# Patient Record
Sex: MALE | Race: WHITE | NOT HISPANIC OR LATINO
[De-identification: names, ages, dates, MRNs, and addresses within clinical notes are randomized per-mention and may not be internally consistent; named-entity substitution may affect disease eponyms.]

---

## 2023-03-23 ENCOUNTER — FORM ENCOUNTER (OUTPATIENT)
Age: 58
End: 2023-03-23

## 2023-03-27 PROBLEM — Z00.00 ENCOUNTER FOR PREVENTIVE HEALTH EXAMINATION: Status: ACTIVE | Noted: 2023-03-27

## 2023-03-29 ENCOUNTER — APPOINTMENT (OUTPATIENT)
Dept: INFECTIOUS DISEASE | Facility: CLINIC | Age: 58
End: 2023-03-29
Payer: COMMERCIAL

## 2023-03-29 ENCOUNTER — NON-APPOINTMENT (OUTPATIENT)
Age: 58
End: 2023-03-29

## 2023-03-29 VITALS
DIASTOLIC BLOOD PRESSURE: 72 MMHG | OXYGEN SATURATION: 96 % | HEART RATE: 62 BPM | TEMPERATURE: 98.4 F | SYSTOLIC BLOOD PRESSURE: 118 MMHG

## 2023-03-29 DIAGNOSIS — E78.5 HYPERLIPIDEMIA, UNSPECIFIED: ICD-10-CM

## 2023-03-29 DIAGNOSIS — J45.909 UNSPECIFIED ASTHMA, UNCOMPLICATED: ICD-10-CM

## 2023-03-29 DIAGNOSIS — C67.9 MALIGNANT NEOPLASM OF BLADDER, UNSPECIFIED: ICD-10-CM

## 2023-03-29 DIAGNOSIS — R78.81 BACTEREMIA: ICD-10-CM

## 2023-03-29 DIAGNOSIS — B95.5 BACTEREMIA: ICD-10-CM

## 2023-03-29 DIAGNOSIS — N39.0 URINARY TRACT INFECTION, SITE NOT SPECIFIED: ICD-10-CM

## 2023-03-29 PROCEDURE — 99213 OFFICE O/P EST LOW 20 MIN: CPT

## 2023-03-29 NOTE — REVIEW OF SYSTEMS
[Fever] : no fever [Chills] : no chills [Body Aches] : no body aches [Joint Swelling] : no joint swelling [Joint Stiffness] : no joint stiffness [Anxiety] : no anxiety [Swollen Glands] : no swollen glands [Negative] : Genitourinary

## 2023-03-29 NOTE — REASON FOR VISIT
[Follow-Up: _____] : a [unfilled] follow-up visit [FreeTextEntry1] : Patient here today following up from his stay at [facility] where he was treated for a UTI/prostate abscess. Patient continues IV Ceftriaxone 2G QD via LUE midline and is tolerating it well. Patient states he is feeling well and is not currently experiencing UTI symptoms. He is here today to discuss ongoing care.

## 2023-03-29 NOTE — HISTORY OF PRESENT ILLNESS
[FreeTextEntry1] : 58 y/o man with bladder cancer, NETO, HLD, asthma, who had a recent urological procedure\par TURBT on 3/10 with a needle aspiration of prostate abscess on 3/10, discharged on po cipro for \par 5 days after, who presented to the ER 3/19 with generalized weakness, fatigue, and chills. He\par was diagnosed with a UTI and was discharged on po abx again which he took for 1.5 days the\par was called back to return to the ER for admission for a positive blood culture with GPC. In the \par he was afebrile and without complaints. He denies any more chills. He denies flank pain excep\par for occasional right flank pain which he had before. He denies urinary complaints such as dysuria\par or hematuria. Wound culture from procedure on 3/10 showed Bacteroides ovatus, Fusobacteria\par nucleatum and mixed anaerobic stuart. Blood cx 3/19 grew Streptococcus anginosus. 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 He previously had bladder cancer and received intravesicular BCG in 2021.  No antimycobacterial therapy was started.

## 2023-03-29 NOTE — PHYSICAL EXAM
[General Appearance - Alert] : alert [General Appearance - In No Acute Distress] : in no acute distress [General Appearance - Well Nourished] : well nourished [General Appearance - Well-Appearing] : healthy appearing [Sclera] : the sclera and conjunctiva were normal [Extraocular Movements] : extraocular movements were intact [Hearing Threshold Finger Rub Not Runnels] : hearing was normal [Examination Of The Oral Cavity] : the lips and gums were normal [Oropharynx] : the oropharynx was normal with no thrush [Neck Appearance] : the appearance of the neck was normal [Respiration, Rhythm And Depth] : normal respiratory rhythm and effort [Auscultation Breath Sounds / Voice Sounds] : lungs were clear to auscultation bilaterally [Heart Sounds] : normal S1 and S2 [Murmurs] : no murmurs [Edema] : there was no peripheral edema [Abdomen Tenderness] : non-tender [] : no hepato-splenomegaly [Supraclavicular Lymph Nodes Enlarged Bilaterally] : supraclavicular [Nail Clubbing] : no clubbing  or cyanosis of the fingernails [Motor Tone] : muscle strength and tone were normal

## 2023-03-29 NOTE — ASSESSMENT
[FreeTextEntry1] : 58 y/o man with bladder cancer, NETO, HLD, asthma, who had a recent urological procedure\par TURBT on 3/10 with a needle aspiration of prostate abscess on 3/10, discharged on po cipro for\par 5 days after, who presented to the ER 3/19 with generalized weakness, fatigue, and chills. He\par was diagnosed with a UTI and was discharged on po abx again which he took for 1.5 days then\par was called back to return to the ER for admission for a positive blood culture with GPC.\par Wound culture from procedure on 3/10 showed Bacteroides ovatus, Fusobacterium nucleatum\par and mixed anaerobic stuart\par Blood cx 3/19 grew Streptococcus anginosus-this could be from the prostate itself or from the aspiration of the abscess\par outpatient mycobacteria probe + Mbovis - he is s/p prior BCG ? if active versus residual DNA last\par tx was 2 years ago\par \par REC\par 1.  Complete 14 days of IV Rocephin and oral metronidazole\par 2.  Would hold any antimycobacterial therapy at this time as he has responded to treatment that does no activity against M.bovis\par 3.  Patient should be safe to receive further BCG as needed for bladder cancer\par \par \par \par Patient return here on a as needed basis

## 2023-06-15 ENCOUNTER — OFFICE (OUTPATIENT)
Dept: URBAN - METROPOLITAN AREA CLINIC 63 | Facility: CLINIC | Age: 58
Setting detail: OPHTHALMOLOGY
End: 2023-06-15
Payer: COMMERCIAL

## 2023-06-15 DIAGNOSIS — H01.001: ICD-10-CM

## 2023-06-15 DIAGNOSIS — H16.223: ICD-10-CM

## 2023-06-15 DIAGNOSIS — H25.13: ICD-10-CM

## 2023-06-15 DIAGNOSIS — H01.005: ICD-10-CM

## 2023-06-15 DIAGNOSIS — H40.013: ICD-10-CM

## 2023-06-15 DIAGNOSIS — H52.4: ICD-10-CM

## 2023-06-15 DIAGNOSIS — H01.004: ICD-10-CM

## 2023-06-15 DIAGNOSIS — H01.002: ICD-10-CM

## 2023-06-15 PROCEDURE — 92133 CPTRZD OPH DX IMG PST SGM ON: CPT | Performed by: STUDENT IN AN ORGANIZED HEALTH CARE EDUCATION/TRAINING PROGRAM

## 2023-06-15 PROCEDURE — 92004 COMPRE OPH EXAM NEW PT 1/>: CPT | Performed by: STUDENT IN AN ORGANIZED HEALTH CARE EDUCATION/TRAINING PROGRAM

## 2023-06-15 PROCEDURE — 92015 DETERMINE REFRACTIVE STATE: CPT | Performed by: STUDENT IN AN ORGANIZED HEALTH CARE EDUCATION/TRAINING PROGRAM

## 2023-06-15 ASSESSMENT — REFRACTION_MANIFEST
OD_AXIS: 180
OD_CYLINDER: -0.25
OD_VA1: 20/20
OS_ADD: +2.00
OS_VA2: 20/20(J1+)
OS_SPHERE: +0.50
OD_VA1: 20/20
OD_SPHERE: +0.75
OD_ADD: +2.00
OD_ADD: +2.50
OD_SPHERE: +0.50
OS_ADD: +2.00
OD_AXIS: 070
OD_VA2: 20/20(J1+)
OS_CYLINDER: SPHERE
OS_SPHERE: +0.50
OS_SPHERE: +0.50
OS_AXIS: 0
OS_ADD: +2.50
OS_CYLINDER: -0.25
OS_VA1: 20/20
OS_AXIS: 180
OD_ADD: +2.00
OD_SPHERE: +0.50
OS_VA1: 20/20
OD_CYLINDER: SPHERE

## 2023-06-15 ASSESSMENT — KERATOMETRY
OS_K1POWER_DIOPTERS: 42.25
OD_K1POWER_DIOPTERS: 41.75
OS_K2POWER_DIOPTERS: 42.25
OD_AXISANGLE_DEGREES: 044
OS_AXISANGLE_DEGREES: 090
OD_K2POWER_DIOPTERS: 42.00

## 2023-06-15 ASSESSMENT — REFRACTION_CURRENTRX
OS_AXIS: 180
OS_SPHERE: +2.50
OS_SPHERE: +0.50
OD_SPHERE: +0.50
OS_VPRISM_DIRECTION: PROGS
OS_OVR_VA: 20/
OD_OVR_VA: 20/
OD_OVR_VA: 20/
OD_SPHERE: +2.50
OD_AXIS: 180
OS_OVR_VA: 20/
OD_ADD: +2.00
OD_VPRISM_DIRECTION: PROGS
OD_CYLINDER: 0.00
OS_CYLINDER: 0.00
OS_ADD: +2.00

## 2023-06-15 ASSESSMENT — AXIALLENGTH_DERIVED
OS_AL: 23.8095
OD_AL: 23.9507
OS_AL: 23.9091

## 2023-06-15 ASSESSMENT — SPHEQUIV_DERIVED
OD_SPHEQUIV: 0.625
OS_SPHEQUIV: 0.625
OS_SPHEQUIV: 0.375

## 2023-06-15 ASSESSMENT — REFRACTION_AUTOREFRACTION
OS_CYLINDER: -0.25
OS_SPHERE: +0.75
OS_AXIS: 079
OD_SPHERE: +0.75

## 2023-06-15 ASSESSMENT — PACHYMETRY
OS_CT_CORRECTION: -2
OS_CT_UM: 572
OD_CT_CORRECTION: -2
OD_CT_UM: 577

## 2023-06-15 ASSESSMENT — VISUAL ACUITY
OD_BCVA: 20/20
OS_BCVA: 20/20

## 2023-06-15 ASSESSMENT — LID EXAM ASSESSMENTS
OD_BLEPHARITIS: RLL RUL 1+
OS_BLEPHARITIS: LLL LUL 1+

## 2023-06-15 ASSESSMENT — SUPERFICIAL PUNCTATE KERATITIS (SPK)
OS_SPK: 1+ 2+
OD_SPK: 1+ 2+

## 2023-06-15 ASSESSMENT — CONFRONTATIONAL VISUAL FIELD TEST (CVF)
OS_FINDINGS: FULL
OD_FINDINGS: FULL

## 2023-06-15 ASSESSMENT — TONOMETRY
OD_IOP_MMHG: 20
OD_IOP_MMHG: 18
OS_IOP_MMHG: 20

## 2023-12-19 ENCOUNTER — OFFICE (OUTPATIENT)
Dept: URBAN - METROPOLITAN AREA CLINIC 63 | Facility: CLINIC | Age: 58
Setting detail: OPHTHALMOLOGY
End: 2023-12-19
Payer: COMMERCIAL

## 2023-12-19 DIAGNOSIS — H01.001: ICD-10-CM

## 2023-12-19 DIAGNOSIS — H40.013: ICD-10-CM

## 2023-12-19 DIAGNOSIS — H16.223: ICD-10-CM

## 2023-12-19 DIAGNOSIS — H01.005: ICD-10-CM

## 2023-12-19 DIAGNOSIS — H01.002: ICD-10-CM

## 2023-12-19 DIAGNOSIS — H25.13: ICD-10-CM

## 2023-12-19 DIAGNOSIS — H01.004: ICD-10-CM

## 2023-12-19 PROCEDURE — 92083 EXTENDED VISUAL FIELD XM: CPT | Performed by: STUDENT IN AN ORGANIZED HEALTH CARE EDUCATION/TRAINING PROGRAM

## 2023-12-19 PROCEDURE — 92134 CPTRZ OPH DX IMG PST SGM RTA: CPT | Performed by: STUDENT IN AN ORGANIZED HEALTH CARE EDUCATION/TRAINING PROGRAM

## 2023-12-19 PROCEDURE — 92012 INTRM OPH EXAM EST PATIENT: CPT | Performed by: STUDENT IN AN ORGANIZED HEALTH CARE EDUCATION/TRAINING PROGRAM

## 2023-12-19 ASSESSMENT — LID EXAM ASSESSMENTS
OD_BLEPHARITIS: RLL RUL 1+
OS_BLEPHARITIS: LLL LUL 1+

## 2023-12-19 ASSESSMENT — SUPERFICIAL PUNCTATE KERATITIS (SPK)
OS_SPK: 1+ 2+
OD_SPK: 1+ 2+

## 2023-12-19 ASSESSMENT — CONFRONTATIONAL VISUAL FIELD TEST (CVF)
OS_FINDINGS: FULL
OD_FINDINGS: FULL

## 2023-12-20 ENCOUNTER — APPOINTMENT (OUTPATIENT)
Dept: COLORECTAL SURGERY | Facility: CLINIC | Age: 58
End: 2023-12-20
Payer: COMMERCIAL

## 2023-12-20 VITALS
TEMPERATURE: 97.7 F | DIASTOLIC BLOOD PRESSURE: 90 MMHG | BODY MASS INDEX: 32.93 KG/M2 | SYSTOLIC BLOOD PRESSURE: 130 MMHG | WEIGHT: 230 LBS | HEIGHT: 70 IN | HEART RATE: 58 BPM | RESPIRATION RATE: 12 BRPM

## 2023-12-20 DIAGNOSIS — Z87.09 PERSONAL HISTORY OF OTHER DISEASES OF THE RESPIRATORY SYSTEM: ICD-10-CM

## 2023-12-20 DIAGNOSIS — Z85.51 PERSONAL HISTORY OF MALIGNANT NEOPLASM OF BLADDER: ICD-10-CM

## 2023-12-20 DIAGNOSIS — Z87.442 PERSONAL HISTORY OF URINARY CALCULI: ICD-10-CM

## 2023-12-20 DIAGNOSIS — K57.32 DIVERTICULITIS OF LARGE INTESTINE W/OUT PERFORATION OR ABSCESS W/OUT BLEEDING: ICD-10-CM

## 2023-12-20 DIAGNOSIS — Z86.69 PERSONAL HISTORY OF OTHER DISEASES OF THE NERVOUS SYSTEM AND SENSE ORGANS: ICD-10-CM

## 2023-12-20 DIAGNOSIS — G47.33 OBSTRUCTIVE SLEEP APNEA (ADULT) (PEDIATRIC): ICD-10-CM

## 2023-12-20 DIAGNOSIS — A18.12 TUBERCULOSIS OF BLADDER: ICD-10-CM

## 2023-12-20 DIAGNOSIS — Z87.891 PERSONAL HISTORY OF NICOTINE DEPENDENCE: ICD-10-CM

## 2023-12-20 DIAGNOSIS — Z87.19 PERSONAL HISTORY OF OTHER DISEASES OF THE DIGESTIVE SYSTEM: ICD-10-CM

## 2023-12-20 DIAGNOSIS — Z82.5 FAMILY HISTORY OF ASTHMA AND OTHER CHRONIC LOWER RESPIRATORY DISEASES: ICD-10-CM

## 2023-12-20 DIAGNOSIS — Z86.010 PERSONAL HISTORY OF COLONIC POLYPS: ICD-10-CM

## 2023-12-20 DIAGNOSIS — N41.2 ABSCESS OF PROSTATE: ICD-10-CM

## 2023-12-20 DIAGNOSIS — Z78.9 OTHER SPECIFIED HEALTH STATUS: ICD-10-CM

## 2023-12-20 DIAGNOSIS — Z87.898 PERSONAL HISTORY OF OTHER SPECIFIED CONDITIONS: ICD-10-CM

## 2023-12-20 PROBLEM — Z79.891 LONG TERM USE OF OTHER MEDICATIONS: Status: ACTIVE | Noted: 2023-12-19

## 2023-12-20 PROCEDURE — 99204 OFFICE O/P NEW MOD 45 MIN: CPT

## 2023-12-20 RX ORDER — CETIRIZINE HYDROCHLORIDE 10 MG/1
10 TABLET, COATED ORAL
Refills: 0 | Status: ACTIVE | COMMUNITY

## 2023-12-20 RX ORDER — RIFAMPIN 300 MG/1
300 CAPSULE ORAL
Refills: 0 | Status: ACTIVE | COMMUNITY

## 2023-12-20 RX ORDER — ASPIRIN 81 MG
81 TABLET, DELAYED RELEASE (ENTERIC COATED) ORAL
Refills: 0 | Status: ACTIVE | COMMUNITY

## 2023-12-20 RX ORDER — SUVOREXANT 15 MG/1
15 TABLET, FILM COATED ORAL
Refills: 0 | Status: ACTIVE | COMMUNITY

## 2023-12-20 RX ORDER — MONTELUKAST SODIUM 10 MG/1
10 TABLET, FILM COATED ORAL
Refills: 0 | Status: ACTIVE | COMMUNITY

## 2023-12-20 RX ORDER — FENOFIBRATE 145 MG/1
145 TABLET, COATED ORAL
Refills: 0 | Status: ACTIVE | COMMUNITY

## 2023-12-20 RX ORDER — MOMETASONE FUROATE 50 UG/1
50 SPRAY NASAL
Refills: 0 | Status: ACTIVE | COMMUNITY

## 2023-12-20 RX ORDER — FLUTICASONE PROPION/SALMETEROL 100-50 MCG
100-50 BLISTER, WITH INHALATION DEVICE INHALATION
Refills: 0 | Status: ACTIVE | COMMUNITY

## 2023-12-20 RX ORDER — DICYCLOMINE HYDROCHLORIDE 20 MG/1
20 TABLET ORAL
Refills: 0 | Status: ACTIVE | COMMUNITY

## 2023-12-20 RX ORDER — GABAPENTIN 100 MG/1
CAPSULE ORAL
Refills: 0 | Status: ACTIVE | COMMUNITY

## 2023-12-20 RX ORDER — ISONIAZID 300 MG/1
300 TABLET ORAL
Refills: 0 | Status: ACTIVE | COMMUNITY

## 2023-12-20 RX ORDER — DICLOFENAC SODIUM 75 MG/1
75 TABLET, DELAYED RELEASE ORAL
Refills: 0 | Status: ACTIVE | COMMUNITY

## 2023-12-20 RX ORDER — LACTOBACILLUS ACIDOPHILUS/PECT 30 MG-20MG
TABLET ORAL
Refills: 0 | Status: ACTIVE | COMMUNITY

## 2023-12-20 RX ORDER — ETHAMBUTOL HYDROCHLORIDE 400 MG/1
400 TABLET ORAL
Refills: 0 | Status: ACTIVE | COMMUNITY

## 2023-12-20 ASSESSMENT — REFRACTION_CURRENTRX
OD_AXIS: 180
OS_CYLINDER: 0.00
OD_CYLINDER: 0.00
OD_SPHERE: +0.50
OD_ADD: +2.00
OS_SPHERE: +0.50
OD_SPHERE: +2.50
OD_OVR_VA: 20/
OS_OVR_VA: 20/
OS_ADD: +2.00
OD_VPRISM_DIRECTION: PROGS
OS_AXIS: 180
OS_VPRISM_DIRECTION: PROGS
OS_SPHERE: +2.50
OS_OVR_VA: 20/
OD_OVR_VA: 20/

## 2023-12-20 ASSESSMENT — REFRACTION_MANIFEST
OS_SPHERE: +0.50
OS_ADD: +2.00
OD_AXIS: 070
OS_SPHERE: +0.50
OS_AXIS: 0
OD_ADD: +2.00
OD_SPHERE: +0.75
OD_VA1: 20/20
OS_CYLINDER: SPHERE
OD_ADD: +2.50
OD_VA2: 20/20(J1+)
OS_VA2: 20/20(J1+)
OD_ADD: +2.00
OD_SPHERE: +0.50
OS_VA1: 20/20
OS_SPHERE: +0.50
OD_CYLINDER: -0.25
OD_VA1: 20/20
OS_CYLINDER: -0.25
OS_ADD: +2.50
OS_AXIS: 180
OD_CYLINDER: SPHERE
OD_SPHERE: +0.50
OS_ADD: +2.00
OD_AXIS: 180
OS_VA1: 20/20

## 2023-12-20 ASSESSMENT — REFRACTION_AUTOREFRACTION
OS_CYLINDER: -0.25
OD_SPHERE: +0.75
OS_AXIS: 086
OS_SPHERE: +0.75

## 2023-12-20 ASSESSMENT — SPHEQUIV_DERIVED
OD_SPHEQUIV: 0.625
OS_SPHEQUIV: 0.625
OS_SPHEQUIV: 0.375

## 2023-12-21 NOTE — HISTORY OF PRESENT ILLNESS
[FreeTextEntry1] : 57yo WM with initial bout of CT proven diverticulitis @15yrs ago. Over the years pt has been treated @7times with ABX.  2021 CT scan performed during treatment for diverticulitis. This revealed residual changes. Also revealed bladder abnormality (ultimately dxd with bladder CA. Underwent TURBT/BCG).  July 2023 CT revealed SC diverticulitis. Dec 5, 2023 CT revealed desc-sigmoid junction diverticulitis. Started oral ABX (last dose 12/19).  Presently without pain. Daily BM. Probiotic daily. Last colonoscopy 4yrs ago. Advised to schedule colorectal consultation.

## 2023-12-21 NOTE — ASSESSMENT
[FreeTextEntry1] : 58-year-old gentleman who presents today for consultation regarding management of recurrent diverticulitis.  Patient's had approximately 7 bouts of diverticulitis over the past 15 years.  His most recent bouts include one in July 2021 involving the proximal sigmoid colon.  In July 23 he again had sigmoid diverticulitis.  He went for a follow-up CAT scan in December 2023 and basically had minimal symptomatology but was found to have diverticulitis at the descending sigmoid junction.  Subsequent to this study his pain actually increased and he describes this as one of, if not the worst, of his bouts.  He just completed a course of antibiotics and feels well.  His last colonoscopy was 4 years ago.  His past medical history includes asthma, irritable bowel syndrome and nephrolithiasis.  He also was found to have a prostate abscess which required a transurethral drainage.  Apparently cultures revealed Mycobacterium bovis in the abscess fluid and therefore the patient has been on triple antibiotic therapy (INH, Rifampin and ethambutol).  He has also had a cystoscopy TURBT for bladder tumor with BCG instillation but apparently there is no evidence of recurrent bladder tumor.  He has never had a perforation or an abscess related to diverticulitis.  At this point the patient no longer wants to deal with the issue of diverticulitis.  I do believe that after 5 bouts and at least 3 of which being CAT scan proven, it is reasonable to consider an elective resection.  We discussed the etiology and treatment of diverticulitis.  We discussed noncomplicated and complicated bouts.  We discussed operative approach including a laparoscopic assisted approach, anticipated operative time, hospital stay and time to complete recuperation.  Risks, alternatives and benefits including but not limited to anastomotic leak, wound infection and deep venous thrombosis were discussed.  Questions were answered and the patient will reach out if he wishes to proceed with surgical scheduling.  Since he had a colonoscopy within the past 5 years I do not believe a repeat scope is indicated preoperatively.

## 2023-12-21 NOTE — PHYSICAL EXAM
[Normal Breath Sounds] : Normal breath sounds [Normal Heart Sounds] : normal heart sounds [Normal Rate and Rhythm] : normal rate and rhythm [No Edema] : No edema [Alert] : alert [Oriented to Person] : oriented to person [Oriented to Place] : oriented to place [Oriented to Time] : oriented to time [Anxious] : anxious [de-identified] : round soft +BS NT/ND [de-identified] : NC/AT [de-identified] : +ROM [de-identified] : intact

## 2024-03-29 ENCOUNTER — OUTPATIENT (OUTPATIENT)
Dept: OUTPATIENT SERVICES | Facility: HOSPITAL | Age: 59
LOS: 1 days | End: 2024-03-29
Payer: COMMERCIAL

## 2024-03-29 VITALS
RESPIRATION RATE: 16 BRPM | HEIGHT: 70 IN | DIASTOLIC BLOOD PRESSURE: 83 MMHG | HEART RATE: 76 BPM | OXYGEN SATURATION: 96 % | SYSTOLIC BLOOD PRESSURE: 127 MMHG | TEMPERATURE: 99 F | WEIGHT: 229.06 LBS

## 2024-03-29 DIAGNOSIS — A15.9 RESPIRATORY TUBERCULOSIS UNSPECIFIED: ICD-10-CM

## 2024-03-29 DIAGNOSIS — J45.909 UNSPECIFIED ASTHMA, UNCOMPLICATED: ICD-10-CM

## 2024-03-29 DIAGNOSIS — K57.32 DIVERTICULITIS OF LARGE INTESTINE WITHOUT PERFORATION OR ABSCESS WITHOUT BLEEDING: ICD-10-CM

## 2024-03-29 DIAGNOSIS — Z90.89 ACQUIRED ABSENCE OF OTHER ORGANS: Chronic | ICD-10-CM

## 2024-03-29 DIAGNOSIS — Z98.890 OTHER SPECIFIED POSTPROCEDURAL STATES: Chronic | ICD-10-CM

## 2024-03-29 DIAGNOSIS — K57.92 DIVERTICULITIS OF INTESTINE, PART UNSPECIFIED, WITHOUT PERFORATION OR ABSCESS WITHOUT BLEEDING: ICD-10-CM

## 2024-03-29 DIAGNOSIS — Z01.818 ENCOUNTER FOR OTHER PREPROCEDURAL EXAMINATION: ICD-10-CM

## 2024-03-29 DIAGNOSIS — Z29.9 ENCOUNTER FOR PROPHYLACTIC MEASURES, UNSPECIFIED: ICD-10-CM

## 2024-03-29 LAB
ANION GAP SERPL CALC-SCNC: 15 MMOL/L — SIGNIFICANT CHANGE UP (ref 5–17)
BLD GP AB SCN SERPL QL: NEGATIVE — SIGNIFICANT CHANGE UP
BUN SERPL-MCNC: 22 MG/DL — SIGNIFICANT CHANGE UP (ref 7–23)
CALCIUM SERPL-MCNC: 9.7 MG/DL — SIGNIFICANT CHANGE UP (ref 8.4–10.5)
CHLORIDE SERPL-SCNC: 100 MMOL/L — SIGNIFICANT CHANGE UP (ref 96–108)
CO2 SERPL-SCNC: 24 MMOL/L — SIGNIFICANT CHANGE UP (ref 22–31)
CREAT SERPL-MCNC: 1.25 MG/DL — SIGNIFICANT CHANGE UP (ref 0.5–1.3)
EGFR: 66 ML/MIN/1.73M2 — SIGNIFICANT CHANGE UP
GLUCOSE SERPL-MCNC: 97 MG/DL — SIGNIFICANT CHANGE UP (ref 70–99)
HCT VFR BLD CALC: 42.1 % — SIGNIFICANT CHANGE UP (ref 39–50)
HGB BLD-MCNC: 14.1 G/DL — SIGNIFICANT CHANGE UP (ref 13–17)
MCHC RBC-ENTMCNC: 29.7 PG — SIGNIFICANT CHANGE UP (ref 27–34)
MCHC RBC-ENTMCNC: 33.5 GM/DL — SIGNIFICANT CHANGE UP (ref 32–36)
MCV RBC AUTO: 88.8 FL — SIGNIFICANT CHANGE UP (ref 80–100)
NRBC # BLD: 0 /100 WBCS — SIGNIFICANT CHANGE UP (ref 0–0)
PLATELET # BLD AUTO: 213 K/UL — SIGNIFICANT CHANGE UP (ref 150–400)
POTASSIUM SERPL-MCNC: 4.1 MMOL/L — SIGNIFICANT CHANGE UP (ref 3.5–5.3)
POTASSIUM SERPL-SCNC: 4.1 MMOL/L — SIGNIFICANT CHANGE UP (ref 3.5–5.3)
RBC # BLD: 4.74 M/UL — SIGNIFICANT CHANGE UP (ref 4.2–5.8)
RBC # FLD: 13.9 % — SIGNIFICANT CHANGE UP (ref 10.3–14.5)
RH IG SCN BLD-IMP: POSITIVE — SIGNIFICANT CHANGE UP
SODIUM SERPL-SCNC: 139 MMOL/L — SIGNIFICANT CHANGE UP (ref 135–145)
WBC # BLD: 5.85 K/UL — SIGNIFICANT CHANGE UP (ref 3.8–10.5)
WBC # FLD AUTO: 5.85 K/UL — SIGNIFICANT CHANGE UP (ref 3.8–10.5)

## 2024-03-29 PROCEDURE — 80048 BASIC METABOLIC PNL TOTAL CA: CPT

## 2024-03-29 PROCEDURE — 86900 BLOOD TYPING SEROLOGIC ABO: CPT

## 2024-03-29 PROCEDURE — 87086 URINE CULTURE/COLONY COUNT: CPT

## 2024-03-29 PROCEDURE — 85027 COMPLETE CBC AUTOMATED: CPT

## 2024-03-29 PROCEDURE — G0463: CPT

## 2024-03-29 PROCEDURE — 87077 CULTURE AEROBIC IDENTIFY: CPT

## 2024-03-29 PROCEDURE — 87186 SC STD MICRODIL/AGAR DIL: CPT

## 2024-03-29 PROCEDURE — 86850 RBC ANTIBODY SCREEN: CPT

## 2024-03-29 PROCEDURE — 83036 HEMOGLOBIN GLYCOSYLATED A1C: CPT

## 2024-03-29 PROCEDURE — 86901 BLOOD TYPING SEROLOGIC RH(D): CPT

## 2024-03-29 RX ORDER — CIPROFLOXACIN LACTATE 400MG/40ML
400 VIAL (ML) INTRAVENOUS ONCE
Refills: 0 | Status: DISCONTINUED | OUTPATIENT
Start: 2024-04-16 | End: 2024-04-20

## 2024-03-29 RX ORDER — CHLORHEXIDINE GLUCONATE 213 G/1000ML
1 SOLUTION TOPICAL ONCE
Refills: 0 | Status: DISCONTINUED | OUTPATIENT
Start: 2024-04-16 | End: 2024-04-20

## 2024-03-29 NOTE — H&P PST ADULT - HISTORY OF PRESENT ILLNESS
59 yo male, PMH NETO, insomina, IBS, GERD, asthma - well controlled, nephrolithiasis s/p ureteroscopy, prostate abscess with cultures positive for Mycobacterium bovis - pt. was treated with INH, Rifampin and Ethambutol bladder CA s/p TURBT and BCG instillation, diverticulitis with about 7 bouts over the past 15 years, the most recent 7/2023 - f/u CT scan 12/2023 without symptoms but revealed diverticulitis at the descending sigmoid junction and actually became symptomatic after CT scan. Pt. presents to PST for scheduled Laparoscopic Low Anterior Resection on 4/16/24. Denies recent fever, chills, chest pain, SOB, changes in bowel/urinary habits or recent exposure to COVID-19 infection.   septicimia with strep, echo  tues-fri midline saturday for 10 days    4 bouts in one year 59 yo male, PMH NETO, insomnia, IBS, GERD, asthma - well controlled, nephrolithiasis s/p ureteroscopy, bladder CA s/p TURBT and BCG instillation 2021, recently admitted at [facility] where he was treated for a UTI/prostate abscess (mycobacterium bovis) with bacteremia (positive with GPC) pt. d/c home with midline and IV antibiotics - midline d/c and presently on PO INH, Rifampin and Ethambutol (treatment for 9-12 months). Pt. with h/o diverticulitis and had 4 bouts within a year - presents to PST for scheduled Laparoscopic Low Anterior Resection on 4/16/24. Denies recent fever, chills, chest pain, SOB, changes in bowel/urinary habits or recent exposure to COVID-19 infection.

## 2024-03-29 NOTE — H&P PST ADULT - NEGATIVE ENMT SYMPTOMS
no sinus symptoms/no nasal congestion/no throat pain/no dysphagia no hearing difficulty/no sinus symptoms/no nasal congestion/no throat pain/no dysphagia

## 2024-03-29 NOTE — H&P PST ADULT - NSICDXPASTMEDICALHX_GEN_ALL_CORE_FT
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

## 2024-03-29 NOTE — H&P PST ADULT - ASSESSMENT
Activity: Can walk 5 blocks, 2 flights of stairs, lives alone - does all house chores    DASI scale:    Mallampati:    Dentition: Denies loose teeth/dentures Activity: Can walk 5 blocks, 2 flights of stairs, lives alone - does all house chores    DASI scale: 6.70    Mallampati: 3    Dentition: Denies loose teeth/dentures    SANDRAI VTE 2.0 SCORE [CLOT updated 2019]    AGE RELATED RISK FACTORS                                                       MOBILITY RELATED FACTORS  [ x] Age 41-60 years                                            (1 Point)                    [ ] Bed rest                                                        (1 Point)  [ ] Age: 61-74 years                                           (2 Points)                  [ ] Plaster cast                                                   (2 Points)  [ ] Age= 75 years                                              (3 Points)                    [ ] Bed bound for more than 72 hours                 (2 Points)    DISEASE RELATED RISK FACTORS                                               GENDER SPECIFIC FACTORS  [ ] Edema in the lower extremities                       (1 Point)              [ ] Pregnancy                                                     (1 Point)  [ ] Varicose veins                                               (1 Point)                     [ ] Post-partum < 6 weeks                                   (1 Point)             [ x] BMI > 25 Kg/m2                                            (1 Point)                     [ ] Hormonal therapy  or oral contraception          (1 Point)                 [ ] Sepsis (in the previous month)                        (1 Point)               [ ] History of pregnancy complications                 (1 point)  [ ] Pneumonia or serious lung disease                                               [ ] Unexplained or recurrent                     (1 Point)           (in the previous month)                               (1 Point)  [ ] Abnormal pulmonary function test                     (1 Point)                 SURGERY RELATED RISK FACTORS  [ ] Acute myocardial infarction                              (1 Point)               [ ]  Section                                             (1 Point)  [ ] Congestive heart failure (in the previous month)  (1 Point)      [ ] Minor surgery                                                  (1 Point)   [ ] Inflammatory bowel disease                             (1 Point)               [ ] Arthroscopic surgery                                        (2 Points)  [ ] Central venous access                                      (2 Points)                [x ] General surgery lasting more than 45 minutes (2 points)  [x ] Malignancy- Present or previous                   (2 Points)                [ ] Elective arthroplasty                                         (5 points)    [ ] Stroke (in the previous month)                          (5 Points)                                                                                                                                                           HEMATOLOGY RELATED FACTORS                                                 TRAUMA RELATED RISK FACTORS  [ ] Prior episodes of VTE                                     (3 Points)                [ ] Fracture of the hip, pelvis, or leg                       (5 Points)  [ ] Positive family history for VTE                         (3 Points)             [ ] Acute spinal cord injury (in the previous month)  (5 Points)  [ ] Prothrombin 42573 A                                     (3 Points)               [ ] Paralysis  (less than 1 month)                             (5 Points)  [ ] Factor V Leiden                                             (3 Points)                  [ ] Multiple Trauma within 1 month                        (5 Points)  [ ] Lupus anticoagulants                                     (3 Points)                                                           [ ] Anticardiolipin antibodies                               (3 Points)                                                       [ ] High homocysteine in the blood                      (3 Points)                                             [ ] Other congenital or acquired thrombophilia      (3 Points)                                                [ ] Heparin induced thrombocytopenia                  (3 Points)                                     Total Score [      6    ]

## 2024-03-29 NOTE — H&P PST ADULT - GAIT/BALANCE
Behavioral Health Consultation  Arti Cerrato Psy.D. Clinical Psychologist/ Behavioral Health Consultant  11/10/2021  10:11 AM    Time spent with Patient: 20 minutes  This is patient's 24th [facility] appointment. Reason for Consult:    Chief Complaint   Patient presents with    Anxiety     Referring Provider: No referring provider defined for this encounter. Feedback given to PCP. TELEHEALTH VISIT -- Audio/Visual (During RUYAI-47 public health emergency)  }  Pursuant to the emergency declaration under the [facility], [facility] waiver authority and the [employer] Act, this Virtual Visit was conducted, with patient's consent, to reduce the patient's risk of exposure to COVID-19 and provide continuity of care for an established patient. Services were provided through a video synchronous discussion virtually to substitute for in-person clinic visit. Pt gave verbal informed consent to participate in telehealth services. Conducted a risk-benefit analysis and determined that the patient's presenting problems are consistent with the use of telepsychology. Determined that the patient has sufficient knowledge and skills in the use of technology enabling them to adequately benefit from telepsychology. It was determined that this patient was able to be properly treated without an in-person session. Patient verified that they were currently located at the [facility] address that was provided during registration.     Verified the following information:  Patient's identification: Yes  Patient location: [address] [place] [address] [address]   Patient's call back number: [phone]   Patient's emergency contact's name and number, as well as permission to contact them if needed: Extended Emergency Contact Information  Primary Emergency Contact: [place] [address] Phone: [phone]  Relation: Parent  Secondary Emergency Contact: [address] [facility] Phone: [phone]  Mobile Phone: [phone]  Relation: Parent     Provider location: Argelia Mullins:  The pt reported that random pains keep coming up   Mood has been stable but continues to feel frustrated with physical sensations- feeling down, depressed after a few days of feeling it   Work has been ok, not too stressful   Exercising- slightly more frequently, no appetite changes  Falling sleeping ok, continues to wake up 1hr early   Socializing effectively, pain not interfering  Going on a trip after [place] and looking forward     O:  The pt continues to have discomfort/pain. Anxiety has decreased and sleep is improved. There is no interference to daily funx overall but pt is feeling hopeless and depressed when sensation doesn't subside. Encouraged him to make another appointment with PCP for evaluation and management. Discussed using pain reprocessing technique and provided training. A:  MSE:    Appearance: good hygiene  and appropriate attire  Attitude: cooperative and friendly  Consciousness: alert  Orientation: oriented to person, place, time, general circumstance  Memory: recent and remote memory intact  Attention/Concentration: intact during session  Psychomotor Activity:normal  Eye Contact: normal  Speech: normal rate and volume, well-articulated  Mood: good but tired   Affect: euthymic  Perception: within normal limits  Thought Content: within normal limits  Thought Process: logical, coherent and goal-directed  Insight: good  Judgment: intact  Ability to understand instructions: Yes  Ability to respond meaningfully: Yes  Morbid Ideation: no   Suicide Assessment: no suicidal ideation, plan, or intent  Homicidal Ideation: no      A:    Diagnosis:    1.  Social anxiety disorder          Diagnosis Date    Anxiety 11/15/2021    Blood pressure elevated without history of HTN 56/71/0023    Eosinophilic esophagitis 3/33/0462    Migraine without aura and without status migrainosus, not intractable 12/11/2017    New daily persistent headache 10/11/2016    Oropharyngeal dysphagia 3/25/2020         Plan:  Pt interventions:  Trained in strategies for increasing balanced thinking, Trained in relaxation strategies, Conducted functional assessment, [place]-setting to identify pt's primary goals for [employer] [facility] visit / overall health, Supportive techniques, Emphasized self-care as important for managing overall health and Identified relevant behavioral strategies for targeting pain  including mindfulness, expansion and defusion steady gait

## 2024-03-29 NOTE — H&P PST ADULT - PROBLEM SELECTOR PLAN 1
Laparoscopic Low Anterior Resection on 4/16/24  Pre-op instructions, including Chlorhexidine soap, ERP hydration instructions and I/S provided - all questions answered  Labs done at PST  Pt. can continue aspirin 81 mg during rajani-op period

## 2024-03-29 NOTE — H&P PST ADULT - NEGATIVE NEUROLOGICAL SYMPTOMS
no syncope/no vertigo/no difficulty walking/no headache no weakness/no paresthesias/no syncope/no vertigo/no difficulty walking/no headache

## 2024-03-29 NOTE — H&P PST ADULT - NSICDXPASTSURGICALHX_GEN_ALL_CORE_FT
PAST SURGICAL HISTORY:  H/O ureteroscopy     S/P tonsillectomy      PAST SURGICAL HISTORY:  H/O colonoscopy     H/O transurethral resection of bladder tumor (TURBT)     H/O ureteroscopy     S/P tonsillectomy

## 2024-03-29 NOTE — H&P PST ADULT - LAST ECHOCARDIOGRAM
at [facility] 2023 while hospitalized at [facility] 2023 while hospitalized - wnl as per pt.

## 2024-03-29 NOTE — H&P PST ADULT - OTHER CARE PROVIDERS
Dr. James Thrasher, cardiology, [phone]; Dr. Kiki Phillips, ID, [phone]; Dr. Hakeem Rodriguez, urology, [phone]; Dr. Jerson Schofield, GI, [phone], Dr. Cierra Verma, pulmonologist, [phone]

## 2024-03-30 LAB
A1C WITH ESTIMATED AVERAGE GLUCOSE RESULT: 5.8 % — HIGH (ref 4–5.6)
ESTIMATED AVERAGE GLUCOSE: 120 MG/DL — HIGH (ref 68–114)

## 2024-04-01 LAB
-  AMPICILLIN: SIGNIFICANT CHANGE UP
-  CIPROFLOXACIN: SIGNIFICANT CHANGE UP
-  LEVOFLOXACIN: SIGNIFICANT CHANGE UP
-  NITROFURANTOIN: SIGNIFICANT CHANGE UP
-  TETRACYCLINE: SIGNIFICANT CHANGE UP
-  VANCOMYCIN: SIGNIFICANT CHANGE UP
CULTURE RESULTS: ABNORMAL
METHOD TYPE: SIGNIFICANT CHANGE UP
ORGANISM # SPEC MICROSCOPIC CNT: ABNORMAL
ORGANISM # SPEC MICROSCOPIC CNT: ABNORMAL
SPECIMEN SOURCE: SIGNIFICANT CHANGE UP

## 2024-04-02 ENCOUNTER — OFFICE (OUTPATIENT)
Dept: URBAN - METROPOLITAN AREA CLINIC 63 | Facility: CLINIC | Age: 59
Setting detail: OPHTHALMOLOGY
End: 2024-04-02
Payer: COMMERCIAL

## 2024-04-02 DIAGNOSIS — H16.223: ICD-10-CM

## 2024-04-02 DIAGNOSIS — H40.013: ICD-10-CM

## 2024-04-02 DIAGNOSIS — H01.005: ICD-10-CM

## 2024-04-02 DIAGNOSIS — H01.002: ICD-10-CM

## 2024-04-02 DIAGNOSIS — H01.004: ICD-10-CM

## 2024-04-02 DIAGNOSIS — H01.001: ICD-10-CM

## 2024-04-02 DIAGNOSIS — H25.13: ICD-10-CM

## 2024-04-02 DIAGNOSIS — Z79.891: ICD-10-CM

## 2024-04-02 PROBLEM — H02.831 DERMATOCHALASIS; RIGHT UPPER LID, LEFT UPPER LID: Status: ACTIVE | Noted: 2024-04-02

## 2024-04-02 PROBLEM — H02.834 DERMATOCHALASIS; RIGHT UPPER LID, LEFT UPPER LID: Status: ACTIVE | Noted: 2024-04-02

## 2024-04-02 PROCEDURE — 92014 COMPRE OPH EXAM EST PT 1/>: CPT | Performed by: STUDENT IN AN ORGANIZED HEALTH CARE EDUCATION/TRAINING PROGRAM

## 2024-04-02 PROCEDURE — 92250 FUNDUS PHOTOGRAPHY W/I&R: CPT | Performed by: STUDENT IN AN ORGANIZED HEALTH CARE EDUCATION/TRAINING PROGRAM

## 2024-04-02 PROCEDURE — 92083 EXTENDED VISUAL FIELD XM: CPT | Performed by: STUDENT IN AN ORGANIZED HEALTH CARE EDUCATION/TRAINING PROGRAM

## 2024-04-02 ASSESSMENT — LID POSITION - DERMATOCHALASIS
OS_DERMATOCHALASIS: LUL 2+
OD_DERMATOCHALASIS: RUL 2+

## 2024-04-02 ASSESSMENT — LID EXAM ASSESSMENTS
OD_BLEPHARITIS: RLL RUL 1+
OS_BLEPHARITIS: LLL LUL 1+

## 2024-04-15 ENCOUNTER — TRANSCRIPTION ENCOUNTER (OUTPATIENT)
Age: 59
End: 2024-04-15

## 2024-04-16 ENCOUNTER — APPOINTMENT (OUTPATIENT)
Dept: COLORECTAL SURGERY | Facility: HOSPITAL | Age: 59
End: 2024-04-16
Payer: COMMERCIAL

## 2024-04-16 ENCOUNTER — INPATIENT (INPATIENT)
Facility: HOSPITAL | Age: 59
LOS: 3 days | Discharge: HOME | DRG: 392 | End: 2024-04-20
Attending: SURGERY | Admitting: SURGERY
Payer: COMMERCIAL

## 2024-04-16 ENCOUNTER — RESULT REVIEW (OUTPATIENT)
Age: 59
End: 2024-04-16

## 2024-04-16 VITALS
HEART RATE: 64 BPM | DIASTOLIC BLOOD PRESSURE: 80 MMHG | HEIGHT: 70 IN | WEIGHT: 229.06 LBS | RESPIRATION RATE: 16 BRPM | SYSTOLIC BLOOD PRESSURE: 128 MMHG | OXYGEN SATURATION: 96 % | TEMPERATURE: 98 F

## 2024-04-16 DIAGNOSIS — K57.32 DIVERTICULITIS OF LARGE INTESTINE WITHOUT PERFORATION OR ABSCESS WITHOUT BLEEDING: ICD-10-CM

## 2024-04-16 DIAGNOSIS — Z90.89 ACQUIRED ABSENCE OF OTHER ORGANS: Chronic | ICD-10-CM

## 2024-04-16 DIAGNOSIS — Z98.890 OTHER SPECIFIED POSTPROCEDURAL STATES: Chronic | ICD-10-CM

## 2024-04-16 LAB — GLUCOSE BLDC GLUCOMTR-MCNC: 99 MG/DL — SIGNIFICANT CHANGE UP (ref 70–99)

## 2024-04-16 PROCEDURE — 44207 L COLECTOMY/COLOPROCTOSTOMY: CPT

## 2024-04-16 PROCEDURE — 52005 CYSTO W/URTRL CATHJ: CPT

## 2024-04-16 PROCEDURE — 88307 TISSUE EXAM BY PATHOLOGIST: CPT | Mod: 26

## 2024-04-16 PROCEDURE — 44208 L COLECTOMY/COLOPROCTOSTOMY: CPT

## 2024-04-16 PROCEDURE — 44213 LAP MOBIL SPLENIC FL ADD-ON: CPT

## 2024-04-16 PROCEDURE — 45330 DIAGNOSTIC SIGMOIDOSCOPY: CPT | Mod: 59

## 2024-04-16 PROCEDURE — 44187 LAP ILEO/JEJUNO-STOMY: CPT

## 2024-04-16 DEVICE — STAPLER CONTOUR CURVED WITH BLUE CART: Type: IMPLANTABLE DEVICE | Status: FUNCTIONAL

## 2024-04-16 DEVICE — VISTASEAL FIBRIN HUMAN 4ML: Type: IMPLANTABLE DEVICE | Status: FUNCTIONAL

## 2024-04-16 DEVICE — URETERAL CATH WHISTLE TIP 5FR LEFT: Type: IMPLANTABLE DEVICE | Status: FUNCTIONAL

## 2024-04-16 DEVICE — STAPLER ETHICON CIRCULAR XL 29MM: Type: IMPLANTABLE DEVICE | Status: FUNCTIONAL

## 2024-04-16 DEVICE — STAPLER COVIDIEN PURSTRING 65MM: Type: IMPLANTABLE DEVICE | Status: FUNCTIONAL

## 2024-04-16 DEVICE — GUIDEWIRE SENSOR DUAL-FLEX NITINOL STRAIGHT .035" X 150CM: Type: IMPLANTABLE DEVICE | Status: FUNCTIONAL

## 2024-04-16 RX ORDER — PANTOPRAZOLE SODIUM 20 MG/1
40 TABLET, DELAYED RELEASE ORAL
Refills: 0 | Status: DISCONTINUED | OUTPATIENT
Start: 2024-04-16 | End: 2024-04-20

## 2024-04-16 RX ORDER — ACETAMINOPHEN 500 MG
1000 TABLET ORAL EVERY 6 HOURS
Refills: 0 | Status: COMPLETED | OUTPATIENT
Start: 2024-04-16 | End: 2024-04-17

## 2024-04-16 RX ORDER — ONDANSETRON 8 MG/1
4 TABLET, FILM COATED ORAL EVERY 6 HOURS
Refills: 0 | Status: DISCONTINUED | OUTPATIENT
Start: 2024-04-16 | End: 2024-04-17

## 2024-04-16 RX ORDER — OXYCODONE HYDROCHLORIDE 5 MG/1
2.5 TABLET ORAL EVERY 4 HOURS
Refills: 0 | Status: DISCONTINUED | OUTPATIENT
Start: 2024-04-16 | End: 2024-04-20

## 2024-04-16 RX ORDER — SODIUM CHLORIDE 9 MG/ML
3 INJECTION INTRAMUSCULAR; INTRAVENOUS; SUBCUTANEOUS EVERY 8 HOURS
Refills: 0 | Status: DISCONTINUED | OUTPATIENT
Start: 2024-04-16 | End: 2024-04-16

## 2024-04-16 RX ORDER — ASPIRIN/CALCIUM CARB/MAGNESIUM 324 MG
81 TABLET ORAL DAILY
Refills: 0 | Status: DISCONTINUED | OUTPATIENT
Start: 2024-04-17 | End: 2024-04-20

## 2024-04-16 RX ORDER — OXYCODONE HYDROCHLORIDE 5 MG/1
5 TABLET ORAL EVERY 4 HOURS
Refills: 0 | Status: DISCONTINUED | OUTPATIENT
Start: 2024-04-16 | End: 2024-04-20

## 2024-04-16 RX ORDER — CELECOXIB 200 MG/1
400 CAPSULE ORAL ONCE
Refills: 0 | Status: COMPLETED | OUTPATIENT
Start: 2024-04-16 | End: 2024-04-16

## 2024-04-16 RX ORDER — HEPARIN SODIUM 5000 [USP'U]/ML
5000 INJECTION INTRAVENOUS; SUBCUTANEOUS EVERY 8 HOURS
Refills: 0 | Status: DISCONTINUED | OUTPATIENT
Start: 2024-04-16 | End: 2024-04-20

## 2024-04-16 RX ORDER — SODIUM CHLORIDE 9 MG/ML
1000 INJECTION, SOLUTION INTRAVENOUS
Refills: 0 | Status: DISCONTINUED | OUTPATIENT
Start: 2024-04-16 | End: 2024-04-18

## 2024-04-16 RX ORDER — NALBUPHINE HYDROCHLORIDE 10 MG/ML
2.5 INJECTION, SOLUTION INTRAMUSCULAR; INTRAVENOUS; SUBCUTANEOUS EVERY 6 HOURS
Refills: 0 | Status: DISCONTINUED | OUTPATIENT
Start: 2024-04-16 | End: 2024-04-17

## 2024-04-16 RX ORDER — HYDROMORPHONE HYDROCHLORIDE 2 MG/ML
1 INJECTION INTRAMUSCULAR; INTRAVENOUS; SUBCUTANEOUS
Refills: 0 | Status: DISCONTINUED | OUTPATIENT
Start: 2024-04-16 | End: 2024-04-17

## 2024-04-16 RX ORDER — MORPHINE SULFATE 50 MG/1
0.2 CAPSULE, EXTENDED RELEASE ORAL ONCE
Refills: 0 | Status: DISCONTINUED | OUTPATIENT
Start: 2024-04-16 | End: 2024-04-17

## 2024-04-16 RX ORDER — LIDOCAINE HCL 20 MG/ML
0.2 VIAL (ML) INJECTION ONCE
Refills: 0 | Status: DISCONTINUED | OUTPATIENT
Start: 2024-04-16 | End: 2024-04-16

## 2024-04-16 RX ORDER — NALOXONE HYDROCHLORIDE 4 MG/.1ML
0.1 SPRAY NASAL
Refills: 0 | Status: DISCONTINUED | OUTPATIENT
Start: 2024-04-16 | End: 2024-04-17

## 2024-04-16 RX ORDER — ONDANSETRON 8 MG/1
4 TABLET, FILM COATED ORAL ONCE
Refills: 0 | Status: DISCONTINUED | OUTPATIENT
Start: 2024-04-16 | End: 2024-04-16

## 2024-04-16 RX ADMIN — CELECOXIB 400 MILLIGRAM(S): 200 CAPSULE ORAL at 11:41

## 2024-04-16 RX ADMIN — Medication 400 MILLIGRAM(S): at 23:48

## 2024-04-16 RX ADMIN — SODIUM CHLORIDE 40 MILLILITER(S): 9 INJECTION, SOLUTION INTRAVENOUS at 20:30

## 2024-04-16 RX ADMIN — HEPARIN SODIUM 5000 UNIT(S): 5000 INJECTION INTRAVENOUS; SUBCUTANEOUS at 21:25

## 2024-04-16 NOTE — PRE-OP CHECKLIST - TAMPON REMOVED
n/a
gait training/strengthening/balance training/transfer training/bed mobility training/GOAL: Pt will negotiate 5 steps  up and down using HR support, independent  within 2-3 weeks.

## 2024-04-16 NOTE — PRE-OP CHECKLIST - HEIGHT IN FEET
"Chief Complaint   Patient presents with     Port Draw     labs drawn from port by rn.  vs taken     Port accessed with 20 gauge 3/4\" gripper needle and labs drawn by rn.  Port flushed with NS and heparin then de-accessed.  Pt tolerated well.  VS taken.  Pt checked in for next appt.    Lexus Byrd RN      " 5

## 2024-04-16 NOTE — BRIEF OPERATIVE NOTE - OPERATION/FINDINGS
Laparoscopic anterior resection with colorectal anastomosis performed. Donuts intact. Positive air leak test with stream of tny bubbles concerning for donnie from staple line. Laparoscopic splenic flexure mobilization and IMV taken. Anastomosis resected and redone. Positive stream of tiny bubbles on air leak test with second anastomosis. Anastomosis looked good with excellent blood flow and no tension. Donuts intact. Diverting loop ileostomy created.

## 2024-04-16 NOTE — PRE-ANESTHESIA EVALUATION ADULT - NSANTHPMHFT_GEN_ALL_CORE
57 yo male, PMH NETO, insomnia, IBS, GERD, asthma - well controlled, nephrolithiasis s/p ureteroscopy, bladder CA s/p TURBT and BCG instillation 2021, recently admitted at [facility] where he was treated for a UTI/prostate abscess (mycobacterium bovis) with bacteremia (positive with GPC) pt. d/c home with midline and IV antibiotics - midline d/c and presently on PO INH, Rifampin and Ethambutol (treatment for 9-12 months). Pt. with h/o diverticulitis and had 4 bouts within a year - presents to PST for scheduled Laparoscopic Low Anterior Resection on 4/16/24. Denies recent fever, chills, chest pain, SOB, changes in bowel/urinary habits or recent exposure to COVID-19 infection.    Denies CP, SOB, HAAS, N/V

## 2024-04-16 NOTE — CHART NOTE - NSCHARTNOTEFT_GEN_A_CORE
Surgery Post-Op Note    Pre-Op Dx: Diverticulitis   Procedure: Laparoscopic low anterior resection, flexible sigmoidoscopy, and ileostomy creation    PACU labs/imaging for follow-up: none    Subjective:   Pt seen and examined at the bedside. Pt w/ no complaints. Denies F/C/N/V. Pain controlled with medication.     Vital Signs Last 24 Hrs  T(C): 37.2 (16 Apr 2024 18:55), Max: 37.2 (16 Apr 2024 18:55)  T(F): 99 (16 Apr 2024 18:55), Max: 99 (16 Apr 2024 18:55)  HR: 74 (16 Apr 2024 20:00) (64 - 79)  BP: 137/65 (16 Apr 2024 20:00) (125/63 - 139/75)  BP(mean): 93 (16 Apr 2024 20:00) (87 - 100)  RR: 16 (16 Apr 2024 20:00) (16 - 16)  SpO2: 100% (16 Apr 2024 20:00) (96% - 100%)    Parameters below as of 16 Apr 2024 20:00  Patient On (Oxygen Delivery Method): nasal cannula  O2 Flow (L/min): 2      Physical Exam:  General: NAD, resting comfortably in bed  Neuro: A/O x 3, no focal deficits  Pulmonary: airway intact, nonlabored breathing  Cardiovascular: NSR  Abdominal: soft, ND, ATTP, incisions c/d/i, ostomy pink and c/d/i -/-  Extremities: WWP          LABS:            CAPILLARY BLOOD GLUCOSE      POCT Blood Glucose.: 99 mg/dL (16 Apr 2024 11:26)        ABO Interpretation: O (04-16 @ 11:57)        Radiology and Additional Studies:    Assessment:58y Male s/p above procedure    Plan:  ERP  Kay > L Ucath in place  IVF, Diet: CLD  Cipro/clinda  Pain/nausea control PRN  Home meds  Incentive spirometer/OOB/Ambulate  DVT PPX  F/u am labs    Red Surgery       Cuauhtemoc Garcia PGY1 Surgery Post-Op Note    Pre-Op Dx: Diverticulitis   Procedure: Laparoscopic low anterior resection, flexible sigmoidoscopy, and ileostomy creation    PACU labs/imaging for follow-up: none    Subjective:   Pt seen and examined at the bedside. Pt w/ no complaints. Patient denies subjective fever/chills, CP, SOB, n/v, or abdominal pain. Pain well controlled.    Vital Signs Last 24 Hrs  T(C): 37.2 (16 Apr 2024 18:55), Max: 37.2 (16 Apr 2024 18:55)  T(F): 99 (16 Apr 2024 18:55), Max: 99 (16 Apr 2024 18:55)  HR: 74 (16 Apr 2024 20:00) (64 - 79)  BP: 137/65 (16 Apr 2024 20:00) (125/63 - 139/75)  BP(mean): 93 (16 Apr 2024 20:00) (87 - 100)  RR: 16 (16 Apr 2024 20:00) (16 - 16)  SpO2: 100% (16 Apr 2024 20:00) (96% - 100%)    Parameters below as of 16 Apr 2024 20:00  Patient On (Oxygen Delivery Method): nasal cannula  O2 Flow (L/min): 2      Physical Exam:  General: NAD, resting comfortably in bed  Neuro: A/O x 3, no focal deficits  Pulmonary: airway intact, nonlabored breathing  Cardiovascular: NSR  Abdominal: soft, ND, ATTP, incisions c/d/i, ostomy pink and c/d/i -/-  Extremities: WWP          LABS:            CAPILLARY BLOOD GLUCOSE      POCT Blood Glucose.: 99 mg/dL (16 Apr 2024 11:26)        ABO Interpretation: O (04-16 @ 11:57)        Radiology and Additional Studies:    Assessment:58y Male s/p above procedure    Plan:  ERP  Kay > L Ucath in place  IVF, Diet: CLD  Cipro/clinda  Pain/nausea control PRN  Home meds  Incentive spirometer/OOB/Ambulate  DVT PPX  F/u am labs    Red Surgery       Cuauhtemoc Garcia PGY1

## 2024-04-16 NOTE — PATIENT PROFILE ADULT - FALL HARM RISK - RISK INTERVENTIONS
Assistance OOB with selected safe patient handling equipment/Assistance with ambulation/Communicate Fall Risk and Risk Factors to all staff, patient, and family/Monitor gait and stability/Reinforce activity limits and safety measures with patient and family/Sit up slowly, dangle for a short time, stand at bedside before walking/Use of alarms - bed, chair and/or voice tab/Visual Cue: Yellow wristband/Bed in lowest position, wheels locked, appropriate side rails in place/Call bell, personal items and telephone in reach/Instruct patient to call for assistance before getting out of bed or chair/Non-slip footwear when patient is out of bed/[place] to call system/Physically safe environment - no spills, clutter or unnecessary equipment/Purposeful Proactive Rounding/Room/bathroom lighting operational, light cord in reach

## 2024-04-16 NOTE — BRIEF OPERATIVE NOTE - NSICDXBRIEFPROCEDURE_GEN_ALL_CORE_FT
PROCEDURES:  Laparoscopic low anterior resection 16-Apr-2024 18:42:08  Danya Smith  Flexible sigmoidoscopy 16-Apr-2024 18:42:16  Danya Smith  Creation, ileostomy 16-Apr-2024 18:42:32  Danya Smith

## 2024-04-17 LAB
ANION GAP SERPL CALC-SCNC: 11 MMOL/L — SIGNIFICANT CHANGE UP (ref 5–17)
BUN SERPL-MCNC: 13 MG/DL — SIGNIFICANT CHANGE UP (ref 7–23)
CALCIUM SERPL-MCNC: 9 MG/DL — SIGNIFICANT CHANGE UP (ref 8.4–10.5)
CHLORIDE SERPL-SCNC: 105 MMOL/L — SIGNIFICANT CHANGE UP (ref 96–108)
CO2 SERPL-SCNC: 24 MMOL/L — SIGNIFICANT CHANGE UP (ref 22–31)
CREAT SERPL-MCNC: 1.14 MG/DL — SIGNIFICANT CHANGE UP (ref 0.5–1.3)
EGFR: 74 ML/MIN/1.73M2 — SIGNIFICANT CHANGE UP
GLUCOSE SERPL-MCNC: 112 MG/DL — HIGH (ref 70–99)
HCT VFR BLD CALC: 38.6 % — LOW (ref 39–50)
HGB BLD-MCNC: 12.9 G/DL — LOW (ref 13–17)
MAGNESIUM SERPL-MCNC: 2.2 MG/DL — SIGNIFICANT CHANGE UP (ref 1.6–2.6)
MCHC RBC-ENTMCNC: 29.6 PG — SIGNIFICANT CHANGE UP (ref 27–34)
MCHC RBC-ENTMCNC: 33.4 GM/DL — SIGNIFICANT CHANGE UP (ref 32–36)
MCV RBC AUTO: 88.5 FL — SIGNIFICANT CHANGE UP (ref 80–100)
NRBC # BLD: 0 /100 WBCS — SIGNIFICANT CHANGE UP (ref 0–0)
PHOSPHATE SERPL-MCNC: 2.7 MG/DL — SIGNIFICANT CHANGE UP (ref 2.5–4.5)
PLATELET # BLD AUTO: 169 K/UL — SIGNIFICANT CHANGE UP (ref 150–400)
POTASSIUM SERPL-MCNC: 4.1 MMOL/L — SIGNIFICANT CHANGE UP (ref 3.5–5.3)
POTASSIUM SERPL-SCNC: 4.1 MMOL/L — SIGNIFICANT CHANGE UP (ref 3.5–5.3)
RBC # BLD: 4.36 M/UL — SIGNIFICANT CHANGE UP (ref 4.2–5.8)
RBC # FLD: 14 % — SIGNIFICANT CHANGE UP (ref 10.3–14.5)
SODIUM SERPL-SCNC: 140 MMOL/L — SIGNIFICANT CHANGE UP (ref 135–145)
WBC # BLD: 8.48 K/UL — SIGNIFICANT CHANGE UP (ref 3.8–10.5)
WBC # FLD AUTO: 8.48 K/UL — SIGNIFICANT CHANGE UP (ref 3.8–10.5)

## 2024-04-17 RX ORDER — ETHAMBUTOL HYDROCHLORIDE 400 MG/1
400 TABLET, FILM COATED ORAL
Refills: 0 | Status: DISCONTINUED | OUTPATIENT
Start: 2024-04-17 | End: 2024-04-20

## 2024-04-17 RX ORDER — FENOFIBRATE,MICRONIZED 130 MG
145 CAPSULE ORAL AT BEDTIME
Refills: 0 | Status: DISCONTINUED | OUTPATIENT
Start: 2024-04-17 | End: 2024-04-20

## 2024-04-17 RX ORDER — MONTELUKAST 4 MG/1
10 TABLET, CHEWABLE ORAL AT BEDTIME
Refills: 0 | Status: DISCONTINUED | OUTPATIENT
Start: 2024-04-17 | End: 2024-04-20

## 2024-04-17 RX ORDER — TRAZODONE HCL 50 MG
50 TABLET ORAL AT BEDTIME
Refills: 0 | Status: DISCONTINUED | OUTPATIENT
Start: 2024-04-17 | End: 2024-04-20

## 2024-04-17 RX ORDER — ACETAMINOPHEN 500 MG
1000 TABLET ORAL EVERY 6 HOURS
Refills: 0 | Status: DISCONTINUED | OUTPATIENT
Start: 2024-04-18 | End: 2024-04-20

## 2024-04-17 RX ORDER — DIPHENHYDRAMINE HCL 50 MG
25 CAPSULE ORAL ONCE
Refills: 0 | Status: COMPLETED | OUTPATIENT
Start: 2024-04-17 | End: 2024-04-17

## 2024-04-17 RX ORDER — BUDESONIDE AND FORMOTEROL FUMARATE DIHYDRATE 160; 4.5 UG/1; UG/1
2 AEROSOL RESPIRATORY (INHALATION)
Refills: 0 | Status: DISCONTINUED | OUTPATIENT
Start: 2024-04-17 | End: 2024-04-20

## 2024-04-17 RX ORDER — GABAPENTIN 400 MG/1
800 CAPSULE ORAL AT BEDTIME
Refills: 0 | Status: DISCONTINUED | OUTPATIENT
Start: 2024-04-17 | End: 2024-04-20

## 2024-04-17 RX ORDER — HEXAVITAMINS
300 TABLET ORAL DAILY
Refills: 0 | Status: DISCONTINUED | OUTPATIENT
Start: 2024-04-17 | End: 2024-04-20

## 2024-04-17 RX ADMIN — Medication 145 MILLIGRAM(S): at 21:48

## 2024-04-17 RX ADMIN — Medication 1000 MILLIGRAM(S): at 06:15

## 2024-04-17 RX ADMIN — GABAPENTIN 800 MILLIGRAM(S): 400 CAPSULE ORAL at 21:49

## 2024-04-17 RX ADMIN — SODIUM CHLORIDE 40 MILLILITER(S): 9 INJECTION, SOLUTION INTRAVENOUS at 13:28

## 2024-04-17 RX ADMIN — Medication 1000 MILLIGRAM(S): at 00:18

## 2024-04-17 RX ADMIN — HEPARIN SODIUM 5000 UNIT(S): 5000 INJECTION INTRAVENOUS; SUBCUTANEOUS at 05:45

## 2024-04-17 RX ADMIN — Medication 81 MILLIGRAM(S): at 12:45

## 2024-04-17 RX ADMIN — Medication 25 MILLIGRAM(S): at 21:49

## 2024-04-17 RX ADMIN — PANTOPRAZOLE SODIUM 40 MILLIGRAM(S): 20 TABLET, DELAYED RELEASE ORAL at 05:45

## 2024-04-17 RX ADMIN — Medication 1000 MILLIGRAM(S): at 13:15

## 2024-04-17 RX ADMIN — Medication 400 MILLIGRAM(S): at 12:45

## 2024-04-17 RX ADMIN — MONTELUKAST 10 MILLIGRAM(S): 4 TABLET, CHEWABLE ORAL at 21:49

## 2024-04-17 RX ADMIN — Medication 1000 MILLIGRAM(S): at 18:42

## 2024-04-17 RX ADMIN — Medication 400 MILLIGRAM(S): at 18:18

## 2024-04-17 RX ADMIN — HEPARIN SODIUM 5000 UNIT(S): 5000 INJECTION INTRAVENOUS; SUBCUTANEOUS at 21:48

## 2024-04-17 RX ADMIN — Medication 50 MILLIGRAM(S): at 21:49

## 2024-04-17 RX ADMIN — HEPARIN SODIUM 5000 UNIT(S): 5000 INJECTION INTRAVENOUS; SUBCUTANEOUS at 13:28

## 2024-04-17 RX ADMIN — Medication 400 MILLIGRAM(S): at 05:45

## 2024-04-17 NOTE — DIETITIAN INITIAL EVALUATION ADULT - NSFNSGIIOFT_GEN_A_CORE
Reports dry heaving yesterday. Reports having output from ileostomy. Pt not currently ordered for bowel regimen.    04-16-24 @ 07:01  -  04-17-24 @ 07:00  --------------------------------------------------------  OUT:    Ileostomy (mL): 25 mL  Total OUT: 25 mL    Total NET: -25 mL

## 2024-04-17 NOTE — PHYSICAL THERAPY INITIAL EVALUATION ADULT - ADDITIONAL COMMENTS
Pt states he lives alone 1st floor condo with 2 steps to enter, denies having used assistive device and was independent functionally and with ADL's.

## 2024-04-17 NOTE — DIETITIAN INITIAL EVALUATION ADULT - NS FNS DIET ORDER
Diet, Clear Liquid:   Ensure Clear Cans or Servings Per Day:  1     Special Instructions for Nursing:  To include 1 Ensure clear, 1 Lemon Ice, 2 cups of hot water to make tea or broth.  Caffeinated coffee is permissible (04-16-24 @ 12:13)

## 2024-04-17 NOTE — PHYSICAL THERAPY INITIAL EVALUATION ADULT - GAIT TRAINING, PT EVAL
GOAL: Patient will ambulate 300 feet independently with RW. Pt will negotiate 2 steps independently.

## 2024-04-17 NOTE — DIETITIAN INITIAL EVALUATION ADULT - PERTINENT MEDS FT
MEDICATIONS  (STANDING):  acetaminophen   IVPB .. 1000 milliGRAM(s) IV Intermittent every 6 hours  aspirin  chewable 81 milliGRAM(s) Oral daily  chlorhexidine 2% Cloths 1 Application(s) Topical once  ciprofloxacin   IVPB 400 milliGRAM(s) IV Intermittent once  clindamycin IVPB 900 milliGRAM(s) IV Intermittent once  heparin   Injectable 5000 Unit(s) SubCutaneous every 8 hours  lactated ringers. 1000 milliLiter(s) (40 mL/Hr) IV Continuous <Continuous>  morphine PF Spinal 0.2 milliGRAM(s) IntraThecal. once  pantoprazole    Tablet 40 milliGRAM(s) Oral before breakfast    MEDICATIONS  (PRN):  nalbuphine Injectable 2.5 milliGRAM(s) IV Push every 6 hours PRN Pruritus  naloxone Injectable 0.1 milliGRAM(s) IV Push every 3 minutes PRN For ANY of the following changes in patient status:  A. RR LESS THAN 10 breaths per minute, B. Oxygen saturation LESS THAN 90%, C. Sedation score of 6  ondansetron Injectable 4 milliGRAM(s) IV Push every 6 hours PRN Nausea  oxyCODONE    IR 2.5 milliGRAM(s) Oral every 4 hours PRN Moderate Pain (4 - 6)  oxyCODONE    IR 5 milliGRAM(s) Oral every 4 hours PRN Severe Pain (7 - 10)

## 2024-04-17 NOTE — DIETITIAN INITIAL EVALUATION ADULT - ORAL INTAKE PTA/DIET HISTORY
Pt reports good appetite/PO intake PTA, was not following any therapeutic diet PTA.   Confirms NKFA.

## 2024-04-17 NOTE — DIETITIAN INITIAL EVALUATION ADULT - ADD RECOMMEND
1) Defer diet advancement to team; recommend low fiber diet when feasible.   2) Monitor PO intake, GI tolerance, skin integrity, labs, weight, and ileostomy output regularity.   3) Honor food preferences as feasible. Assist with meals PRN and encourage PO intake.  4) RD remains available upon request and will follow-up per protocol.

## 2024-04-17 NOTE — DIETITIAN INITIAL EVALUATION ADULT - PERTINENT LABORATORY DATA
04-17    140  |  105  |  13  ----------------------------<  112<H>  4.1   |  24  |  1.14    Ca    9.0      17 Apr 2024 07:43  Phos  2.7     04-17  Mg     2.2     04-17    A1C with Estimated Average Glucose Result: 5.8 % (03-29-24 @ 17:56)

## 2024-04-17 NOTE — DIETITIAN INITIAL EVALUATION ADULT - NSICDXPASTSURGICALHX_GEN_ALL_CORE_FT
PAST SURGICAL HISTORY:  H/O colonoscopy     H/O transurethral resection of bladder tumor (TURBT)     H/O ureteroscopy     S/P tonsillectomy

## 2024-04-17 NOTE — DIETITIAN INITIAL EVALUATION ADULT - PERSON TAUGHT/METHOD
Reviewed Ileostomy nutrition therapy. Discussed eating low-fiber foods, chewing foods well, small frequent meals high in protein & energy. Reviewed foods that may cause odors &/or gas, discussed foods that bulk stool and thin stool, and importance of adequate hydration. Made aware RD remains available upon request and will follow-up per protocol./verbal instruction/written material/teach back - (Patient repeats in own words)/patient instructed

## 2024-04-17 NOTE — DIETITIAN INITIAL EVALUATION ADULT - OTHER INFO
Reports  lbs 1 year ago. Endorses wt loss to 220 lbs in the past year.  Dosing wt: 229 lbs (04-16)  9% vs 5% wt loss x 1 year, not clinically significant. RD to continue to monitor weight trends as able/available.     Additional nutrition-related concerns:  - s/p LAR with ileostomy

## 2024-04-17 NOTE — PHYSICAL THERAPY INITIAL EVALUATION ADULT - PERTINENT HX OF CURRENT PROBLEM, REHAB EVAL
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 d/c home with midline and IV antibiotics - midline d/c and presently on PO INH, Rifampin and Ethambutol (treatment for 9-12 months). Pt. with h/o diverticulitis and had 4 bouts within a year - presents to PST for scheduled Laparoscopic Low Anterior Resection on 4/16/24. 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 d/c home with midline and IV antibiotics - midline d/c and presently on PO INH, Rifampin and Ethambutol (treatment for 9-12 months). Pt. with h/o diverticulitis and had 4 bouts within a year - presents to PST for scheduled Laparoscopic low anterior resection with colorectal anastomosis on 4/16/24.

## 2024-04-17 NOTE — DIETITIAN INITIAL EVALUATION ADULT - REASON FOR ADMISSION
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 d/c home with midline and IV antibiotics - midline d/c and presently on PO INH, Rifampin and Ethambutol (treatment for 9-12 months). Pt. with h/o diverticulitis and had 4 bouts within a year - presents to PST for scheduled Laparoscopic Low Anterior Resection on 4/16/24.  The patient is now s/p  Laparoscopic Low Anterior Resection and Diverting loop ileostomy on 4/16/24.

## 2024-04-18 LAB
ANION GAP SERPL CALC-SCNC: 10 MMOL/L — SIGNIFICANT CHANGE UP (ref 5–17)
BUN SERPL-MCNC: 10 MG/DL — SIGNIFICANT CHANGE UP (ref 7–23)
CALCIUM SERPL-MCNC: 9.4 MG/DL — SIGNIFICANT CHANGE UP (ref 8.4–10.5)
CHLORIDE SERPL-SCNC: 105 MMOL/L — SIGNIFICANT CHANGE UP (ref 96–108)
CO2 SERPL-SCNC: 25 MMOL/L — SIGNIFICANT CHANGE UP (ref 22–31)
CREAT SERPL-MCNC: 0.97 MG/DL — SIGNIFICANT CHANGE UP (ref 0.5–1.3)
EGFR: 90 ML/MIN/1.73M2 — SIGNIFICANT CHANGE UP
GLUCOSE SERPL-MCNC: 86 MG/DL — SIGNIFICANT CHANGE UP (ref 70–99)
HCT VFR BLD CALC: 37.1 % — LOW (ref 39–50)
HGB BLD-MCNC: 12.2 G/DL — LOW (ref 13–17)
MAGNESIUM SERPL-MCNC: 2.2 MG/DL — SIGNIFICANT CHANGE UP (ref 1.6–2.6)
MCHC RBC-ENTMCNC: 29.5 PG — SIGNIFICANT CHANGE UP (ref 27–34)
MCHC RBC-ENTMCNC: 32.9 GM/DL — SIGNIFICANT CHANGE UP (ref 32–36)
MCV RBC AUTO: 89.6 FL — SIGNIFICANT CHANGE UP (ref 80–100)
NRBC # BLD: 0 /100 WBCS — SIGNIFICANT CHANGE UP (ref 0–0)
PHOSPHATE SERPL-MCNC: 1.2 MG/DL — LOW (ref 2.5–4.5)
PLATELET # BLD AUTO: 171 K/UL — SIGNIFICANT CHANGE UP (ref 150–400)
POTASSIUM SERPL-MCNC: 4 MMOL/L — SIGNIFICANT CHANGE UP (ref 3.5–5.3)
POTASSIUM SERPL-SCNC: 4 MMOL/L — SIGNIFICANT CHANGE UP (ref 3.5–5.3)
RBC # BLD: 4.14 M/UL — LOW (ref 4.2–5.8)
RBC # FLD: 14.3 % — SIGNIFICANT CHANGE UP (ref 10.3–14.5)
SODIUM SERPL-SCNC: 140 MMOL/L — SIGNIFICANT CHANGE UP (ref 135–145)
WBC # BLD: 9.86 K/UL — SIGNIFICANT CHANGE UP (ref 3.8–10.5)
WBC # FLD AUTO: 9.86 K/UL — SIGNIFICANT CHANGE UP (ref 3.8–10.5)

## 2024-04-18 RX ORDER — SODIUM,POTASSIUM PHOSPHATES 278-250MG
2 POWDER IN PACKET (EA) ORAL ONCE
Refills: 0 | Status: COMPLETED | OUTPATIENT
Start: 2024-04-18 | End: 2024-04-18

## 2024-04-18 RX ADMIN — Medication 50 MILLIGRAM(S): at 21:33

## 2024-04-18 RX ADMIN — Medication 2 PACKET(S): at 08:10

## 2024-04-18 RX ADMIN — Medication 1000 MILLIGRAM(S): at 17:36

## 2024-04-18 RX ADMIN — Medication 1000 MILLIGRAM(S): at 00:36

## 2024-04-18 RX ADMIN — OXYCODONE HYDROCHLORIDE 5 MILLIGRAM(S): 5 TABLET ORAL at 15:20

## 2024-04-18 RX ADMIN — MONTELUKAST 10 MILLIGRAM(S): 4 TABLET, CHEWABLE ORAL at 21:33

## 2024-04-18 RX ADMIN — GABAPENTIN 800 MILLIGRAM(S): 400 CAPSULE ORAL at 21:32

## 2024-04-18 RX ADMIN — ETHAMBUTOL HYDROCHLORIDE 400 MILLIGRAM(S): 400 TABLET, FILM COATED ORAL at 12:29

## 2024-04-18 RX ADMIN — HEPARIN SODIUM 5000 UNIT(S): 5000 INJECTION INTRAVENOUS; SUBCUTANEOUS at 21:33

## 2024-04-18 RX ADMIN — Medication 300 MILLIGRAM(S): at 12:29

## 2024-04-18 RX ADMIN — OXYCODONE HYDROCHLORIDE 5 MILLIGRAM(S): 5 TABLET ORAL at 11:13

## 2024-04-18 RX ADMIN — ETHAMBUTOL HYDROCHLORIDE 400 MILLIGRAM(S): 400 TABLET, FILM COATED ORAL at 07:54

## 2024-04-18 RX ADMIN — Medication 20 MILLIGRAM(S): at 17:36

## 2024-04-18 RX ADMIN — Medication 1000 MILLIGRAM(S): at 13:29

## 2024-04-18 RX ADMIN — OXYCODONE HYDROCHLORIDE 5 MILLIGRAM(S): 5 TABLET ORAL at 16:20

## 2024-04-18 RX ADMIN — HEPARIN SODIUM 5000 UNIT(S): 5000 INJECTION INTRAVENOUS; SUBCUTANEOUS at 13:27

## 2024-04-18 RX ADMIN — OXYCODONE HYDROCHLORIDE 5 MILLIGRAM(S): 5 TABLET ORAL at 10:13

## 2024-04-18 RX ADMIN — PANTOPRAZOLE SODIUM 40 MILLIGRAM(S): 20 TABLET, DELAYED RELEASE ORAL at 05:09

## 2024-04-18 RX ADMIN — Medication 20 MILLIGRAM(S): at 08:11

## 2024-04-18 RX ADMIN — HEPARIN SODIUM 5000 UNIT(S): 5000 INJECTION INTRAVENOUS; SUBCUTANEOUS at 05:08

## 2024-04-18 RX ADMIN — BUDESONIDE AND FORMOTEROL FUMARATE DIHYDRATE 2 PUFF(S): 160; 4.5 AEROSOL RESPIRATORY (INHALATION) at 05:08

## 2024-04-18 RX ADMIN — Medication 1000 MILLIGRAM(S): at 23:38

## 2024-04-18 RX ADMIN — Medication 145 MILLIGRAM(S): at 21:32

## 2024-04-18 RX ADMIN — Medication 1000 MILLIGRAM(S): at 18:36

## 2024-04-18 RX ADMIN — Medication 1000 MILLIGRAM(S): at 23:08

## 2024-04-18 RX ADMIN — Medication 1000 MILLIGRAM(S): at 05:07

## 2024-04-18 RX ADMIN — Medication 1000 MILLIGRAM(S): at 00:06

## 2024-04-19 ENCOUNTER — TRANSCRIPTION ENCOUNTER (OUTPATIENT)
Age: 59
End: 2024-04-19

## 2024-04-19 LAB
ANION GAP SERPL CALC-SCNC: 13 MMOL/L — SIGNIFICANT CHANGE UP (ref 5–17)
BUN SERPL-MCNC: 11 MG/DL — SIGNIFICANT CHANGE UP (ref 7–23)
CALCIUM SERPL-MCNC: 9.6 MG/DL — SIGNIFICANT CHANGE UP (ref 8.4–10.5)
CHLORIDE SERPL-SCNC: 103 MMOL/L — SIGNIFICANT CHANGE UP (ref 96–108)
CO2 SERPL-SCNC: 23 MMOL/L — SIGNIFICANT CHANGE UP (ref 22–31)
CREAT SERPL-MCNC: 1 MG/DL — SIGNIFICANT CHANGE UP (ref 0.5–1.3)
EGFR: 87 ML/MIN/1.73M2 — SIGNIFICANT CHANGE UP
GLUCOSE SERPL-MCNC: 86 MG/DL — SIGNIFICANT CHANGE UP (ref 70–99)
HCT VFR BLD CALC: 37.2 % — LOW (ref 39–50)
HGB BLD-MCNC: 12.3 G/DL — LOW (ref 13–17)
MAGNESIUM SERPL-MCNC: 2.1 MG/DL — SIGNIFICANT CHANGE UP (ref 1.6–2.6)
MCHC RBC-ENTMCNC: 29.1 PG — SIGNIFICANT CHANGE UP (ref 27–34)
MCHC RBC-ENTMCNC: 33.1 GM/DL — SIGNIFICANT CHANGE UP (ref 32–36)
MCV RBC AUTO: 88.2 FL — SIGNIFICANT CHANGE UP (ref 80–100)
NRBC # BLD: 0 /100 WBCS — SIGNIFICANT CHANGE UP (ref 0–0)
PHOSPHATE SERPL-MCNC: 1.4 MG/DL — LOW (ref 2.5–4.5)
PLATELET # BLD AUTO: 179 K/UL — SIGNIFICANT CHANGE UP (ref 150–400)
POTASSIUM SERPL-MCNC: 4.2 MMOL/L — SIGNIFICANT CHANGE UP (ref 3.5–5.3)
POTASSIUM SERPL-SCNC: 4.2 MMOL/L — SIGNIFICANT CHANGE UP (ref 3.5–5.3)
RBC # BLD: 4.22 M/UL — SIGNIFICANT CHANGE UP (ref 4.2–5.8)
RBC # FLD: 14 % — SIGNIFICANT CHANGE UP (ref 10.3–14.5)
SODIUM SERPL-SCNC: 139 MMOL/L — SIGNIFICANT CHANGE UP (ref 135–145)
WBC # BLD: 9.32 K/UL — SIGNIFICANT CHANGE UP (ref 3.8–10.5)
WBC # FLD AUTO: 9.32 K/UL — SIGNIFICANT CHANGE UP (ref 3.8–10.5)

## 2024-04-19 RX ORDER — POTASSIUM PHOSPHATE, MONOBASIC POTASSIUM PHOSPHATE, DIBASIC 236; 224 MG/ML; MG/ML
30 INJECTION, SOLUTION INTRAVENOUS ONCE
Refills: 0 | Status: COMPLETED | OUTPATIENT
Start: 2024-04-19 | End: 2024-04-19

## 2024-04-19 RX ORDER — SODIUM,POTASSIUM PHOSPHATES 278-250MG
2 POWDER IN PACKET (EA) ORAL ONCE
Refills: 0 | Status: COMPLETED | OUTPATIENT
Start: 2024-04-19 | End: 2024-04-19

## 2024-04-19 RX ADMIN — Medication 20 MILLIGRAM(S): at 06:01

## 2024-04-19 RX ADMIN — ETHAMBUTOL HYDROCHLORIDE 400 MILLIGRAM(S): 400 TABLET, FILM COATED ORAL at 12:55

## 2024-04-19 RX ADMIN — Medication 2 PACKET(S): at 13:00

## 2024-04-19 RX ADMIN — GABAPENTIN 800 MILLIGRAM(S): 400 CAPSULE ORAL at 21:19

## 2024-04-19 RX ADMIN — HEPARIN SODIUM 5000 UNIT(S): 5000 INJECTION INTRAVENOUS; SUBCUTANEOUS at 18:34

## 2024-04-19 RX ADMIN — Medication 50 MILLIGRAM(S): at 21:18

## 2024-04-19 RX ADMIN — Medication 300 MILLIGRAM(S): at 12:55

## 2024-04-19 RX ADMIN — POTASSIUM PHOSPHATE, MONOBASIC POTASSIUM PHOSPHATE, DIBASIC 83.33 MILLIMOLE(S): 236; 224 INJECTION, SOLUTION INTRAVENOUS at 12:59

## 2024-04-19 RX ADMIN — Medication 1000 MILLIGRAM(S): at 18:55

## 2024-04-19 RX ADMIN — Medication 81 MILLIGRAM(S): at 12:56

## 2024-04-19 RX ADMIN — MONTELUKAST 10 MILLIGRAM(S): 4 TABLET, CHEWABLE ORAL at 21:18

## 2024-04-19 RX ADMIN — Medication 1000 MILLIGRAM(S): at 13:30

## 2024-04-19 RX ADMIN — HEPARIN SODIUM 5000 UNIT(S): 5000 INJECTION INTRAVENOUS; SUBCUTANEOUS at 06:03

## 2024-04-19 RX ADMIN — Medication 1000 MILLIGRAM(S): at 23:40

## 2024-04-19 RX ADMIN — PANTOPRAZOLE SODIUM 40 MILLIGRAM(S): 20 TABLET, DELAYED RELEASE ORAL at 06:07

## 2024-04-19 RX ADMIN — BUDESONIDE AND FORMOTEROL FUMARATE DIHYDRATE 2 PUFF(S): 160; 4.5 AEROSOL RESPIRATORY (INHALATION) at 06:01

## 2024-04-19 RX ADMIN — Medication 1000 MILLIGRAM(S): at 23:10

## 2024-04-19 RX ADMIN — Medication 1000 MILLIGRAM(S): at 18:35

## 2024-04-19 RX ADMIN — Medication 145 MILLIGRAM(S): at 21:18

## 2024-04-19 RX ADMIN — Medication 20 MILLIGRAM(S): at 18:35

## 2024-04-19 RX ADMIN — Medication 1000 MILLIGRAM(S): at 06:02

## 2024-04-19 RX ADMIN — Medication 1000 MILLIGRAM(S): at 06:32

## 2024-04-19 RX ADMIN — Medication 1000 MILLIGRAM(S): at 12:54

## 2024-04-19 NOTE — DISCHARGE NOTE PROVIDER - NSDCMRMEDTOKEN_GEN_ALL_CORE_FT
Advair Diskus 100 mcg-50 mcg inhalation powder: 1 inhaled once a day  aspirin 81 mg oral tablet: orally once a day  Belsomra 15 mg oral tablet: 1 tab(s) orally once a day  cetirizine 10 mg oral tablet: 1 tab(s) orally once a day  DICLOFEN SOD 75MG EC TAB:   DICYCLOMINE 20MG TAB:   ethambutol 400 mg oral tablet: 1 tab(s) orally 3 times a day  FENOFIBRATE 145MG TAB:   GABAPENTIN 100MG CAP:   isoniazid 300 mg oral tablet: 1 tab(s) orally once a day  MONTELUKAST 10MG TAB:   Nasonex 24hr Allergy 50 mcg/inh nasal spray: 2 spray(s) in each nostril once a day  OMEPRAZOLE 20MG CAPSULES: TAKE 1 CAPSULE BY MOUTH EVERY OTHER DAY  PANTOPRAZOLE 40MG TABLETS: TAKE 1 TABLET BY MOUTH EVERY DAY  RIFAMPIN 300MG CAP:   TRAZODONE 50MG TAB:   VITAMIN B-6 50MG TABLETS: TAKE 1 TABLET BY MOUTH DAILY   acetaminophen 500 mg oral tablet: 2 tab(s) orally every 6 hours  Advair Diskus 100 mcg-50 mcg inhalation powder: 1 inhaled once a day  aspirin 81 mg oral tablet: orally once a day  DICYCLOMINE 20MG TAB:   ethambutol 400 mg oral tablet: 1 tab(s) orally 3 times a day  FENOFIBRATE 145MG TAB:   gabapentin 800 mg oral tablet: 1 tab(s) orally once a day (at bedtime) MDD: 1  isoniazid 300 mg oral tablet: 1 tab(s) orally once a day  MONTELUKAST 10MG TAB:   OMEPRAZOLE 20MG CAPSULES: TAKE 1 CAPSULE BY MOUTH EVERY OTHER DAY  oxyCODONE 5 mg oral tablet: 1 tab(s) orally every 6 hours as needed for Severe Pain (7 - 10) MDD: 4  PANTOPRAZOLE 40MG TABLETS: TAKE 1 TABLET BY MOUTH EVERY DAY  RIFAMPIN 300MG CAP:   TRAZODONE 50MG TAB:   VITAMIN B-6 50MG TABLETS: TAKE 1 TABLET BY MOUTH DAILY

## 2024-04-19 NOTE — DISCHARGE NOTE NURSING/CASE MANAGEMENT/SOCIAL WORK - PATIENT PORTAL LINK FT
You can access the FollowMyHealth Patient Portal offered by [facility] by registering at the following website: [email or web address]. By joining [employer]’s FollowMyHealth portal, you will also be able to view your health information using other applications (apps) compatible with our system.

## 2024-04-19 NOTE — DISCHARGE NOTE PROVIDER - NSDCQMCOGNITION_NEU_ALL_CORE
Subjective:      Patient ID: Rachel Whaley. is a 5 y.o. male. HPI Informant: Mom-Nikkie    Concerns:  Doesn't eat well at school. If he skips lunch he is given PB and crackers. Charlie Cagle eats cereal before school but then also eats at school. He reports he eats that well (normally poptarts etc). He prefers not to eat first thing in the morning. Mom states that he eats dinner well. Family usually has a meat and pasta, sauce. Vegetables. Mom plans to leave on medication this summer. A little behind for his reading level. Most recent STARS testing showed ~17% of students nationally. Interval history: no significant illnesses, emergency department visits, surgeries, or changes to family history. Diet History:  Appetite? good   Meats? many   Fruits? many   Vegetables? many   Junk Food?none   Intolerances? no    Sleep History:  Sleep Pattern: no sleep issues     Problems? no    Educational History:  [employer]: [employer] Type of Student: good  Extracurricular Activities: none    Behavioral Assessment:   Is your child restless or overactive? Sometimes   Excitable, impulsive? Never   Fails to finish things he/she starts? Sometimes   Inattentive, easily distracted? Always   Temper outbursts? Sometimes   Fidgeting? Never   Disturbs other children? Never   Demands must be met immediately-easily frustrated? Sometimes   Cries often and easily? Never   Mood changes quickly and drastically? Sometimes    Medications: All medications have been reviewed. Currently is  taking over-the-counter medication(s). Medication(s) currently being used have been reviewed and added to the medication list.    Review of Systems   All other systems reviewed and are negative. Objective:   Physical Exam  Vitals signs and nursing note reviewed. Constitutional:       General: He is not in acute distress. Appearance: He is well-developed.    HENT:      Right Ear: Tympanic membrane normal.      Left Ear: Tympanic membrane normal.      Nose: Nose normal.      Mouth/Throat:      Mouth: Mucous membranes are moist.      Dentition: No dental caries. Pharynx: Oropharynx is clear. Tonsils: No tonsillar exudate. Eyes:      Conjunctiva/sclera: Conjunctivae normal.      Pupils: Pupils are equal, round, and reactive to light. Neck:      Musculoskeletal: Normal range of motion and neck supple. Cardiovascular:      Rate and Rhythm: Normal rate and regular rhythm. Heart sounds: S1 normal. No murmur. Pulmonary:      Effort: Pulmonary effort is normal. No respiratory distress. Breath sounds: Normal breath sounds and air entry. No decreased air movement. Abdominal:      General: Bowel sounds are normal. There is no distension. Palpations: Abdomen is soft. Tenderness: There is no abdominal tenderness. Genitourinary:     Penis: Normal.    Musculoskeletal: Normal range of motion. Skin:     General: Skin is warm and dry. Capillary Refill: Capillary refill takes less than 2 seconds. Findings: No rash. Neurological:      General: No focal deficit present. Mental Status: He is alert. Psychiatric:         Mood and Affect: Mood normal.         Thought Content: Thought content normal.       Assessment:       Diagnosis Orders   1. Health check for child over 34 days old     2. Poor weight gain (0-17)           Plan:      Routine guidance and counseling with emphasis on growth and development. Age appropriate vaccines given and potential side effects discussed if indicated. Growth charts reviewed with family. Reviewed importance of weight gain to support overall growth. Will trial cyproheptadine to help stimulate appetite. All questions answered from family. Return to clinic in 3 months or sooner PRN. No difficulties

## 2024-04-19 NOTE — ADVANCED PRACTICE NURSE CONSULT - REASON FOR CONSULT
On going teaching teaching; s/p Laparoscopic low anterior resection, flexible sigmoidoscopy, and ileostomy creation on 4/16/24.           
On going teaching with patient & sister; s/p Laparoscopic low anterior resection, flexible sigmoidoscopy, and ileostomy creation on 4/16/24.                    
Initial teaching; s/p Laparoscopic low anterior resection, flexible sigmoidoscopy, and ileostomy creation on 4/16/24.

## 2024-04-19 NOTE — DISCHARGE NOTE PROVIDER - HOSPITAL COURSE
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 d/c home with midline and IV antibiotics - midline d/c and presently on PO INH, Rifampin and Ethambutol (treatment for 9-12 months). Pt. with h/o diverticulitis and had 4 bouts within a year - presents to [facility] for scheduled Laparoscopic Low Anterior Resection on 4/16/24. Denies recent fever, chills, chest pain, SOB, changes in bowel/urinary habits or recent exposure to COVID-19 infection.     On 4/16, the patient underwent a Laparoscopic anterior resection with colorectal anastomosis performed. Donuts intact. Positive air leak test with stream of tny bubbles concerning for donnie from staple line. Laparoscopic splenic flexure mobilization and IMV taken. Anastomosis resected and redone. Positive stream of tiny bubbles on air leak test with second anastomosis. Anastomosis looked good with excellent blood flow and no tension. Donuts intact. Diverting loop ileostomy created.    POD1 Pt seen and examined at bedside. Patient comfortable and in no-apparent distress. Pain is controlled. Patient tolerating a clear liquid diet without N/v. Patient denies SOB, chest pain. Kay > L Ucath in place- take out this morning. IVF, Diet: CLD. Home meds were restarted. Patient received ostomy teaching.     POD2: Pt seen and examined at bedside. Patient comfortable and in no-apparent distress. Pain is controlled. Patient tolerating a clear liquid diet without N/v. The diet was advanced to regular food. The patient received more ostomy teaching. On exam, the ostomy had a stool and gas. His incisions were c/d/i.     POD3: Pt seen and examined at bedside. Patient comfortable and in no-apparent distress. Pain is controlled. Patient tolerating a LR diet without N/v. On exam, the ostomy had a stool and gas. His incisions were c/d/i. The red rubber was removed from his ostomy.                    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 d/c home with midline and IV antibiotics - midline d/c and presently on PO INH, Rifampin and Ethambutol (treatment for 9-12 months). Pt. with h/o diverticulitis and had 4 bouts within a year - [facility] for scheduled Laparoscopic Low Anterior Resection on 4/16/24. Denies recent fever, chills, chest pain, SOB, changes in bowel/urinary habits or recent exposure to COVID-19 infection.     On 4/16, the patient underwent a Laparoscopic anterior resection with colorectal anastomosis performed. Donuts intact. Positive air leak test with stream of tny bubbles concerning for donnie from staple line. Laparoscopic splenic flexure mobilization and IMV taken. Anastomosis resected and redone. Positive stream of tiny bubbles on air leak test with second anastomosis. Anastomosis looked good with excellent blood flow and no tension. Donuts intact. Diverting loop ileostomy created.    POD1 Pt seen and examined at bedside. Patient comfortable and in no-apparent distress. Pain is controlled. Patient tolerating a clear liquid diet without N/v. Patient denies SOB, chest pain. Kay > L Ucath in place- take out this morning. IVF, Diet: CLD. Home meds were restarted. Patient received ostomy teaching.     POD2: Pt seen and examined at bedside. Patient comfortable and in no-apparent distress. Pain is controlled. Patient tolerating a clear liquid diet without N/v. The diet was advanced to regular food. The patient received more ostomy teaching. On exam, the ostomy had a stool and gas. His incisions were c/d/i.     POD3: Pt seen and examined at bedside. Patient comfortable and in no-apparent distress. Pain is controlled. Patient tolerating a LR diet without N/v. On exam, the ostomy had a stool and gas. His incisions were c/d/i. The red rubber was removed from his ostomy.     POD4: [place] were removed prior to discharge    Pt to be discharged with ostomy; ostomy teaching done    Physical therapy evaluated the patient, and recommended no skilled PT needs    On the day of discharge, patient's vital signs within normal limits, pain is controlled, voiding urine, passing gas/stool via ileostomy, tolerating low fiber diet, and ambulating well. Pt to f/u with Dr. Clifford in 1-2 weeks after discharge. 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 d/c home with midline and IV antibiotics - midline d/c and presently on PO INH, Rifampin and Ethambutol (treatment for 9-12 months). Pt. with h/o diverticulitis and had 4 bouts within a year - presents to [facility] for scheduled Laparoscopic Low Anterior Resection on 4/16/24. Denies recent fever, chills, chest pain, SOB, changes in bowel/urinary habits or recent exposure to COVID-19 infection.     On 4/16, the patient underwent a Laparoscopic anterior resection with colorectal anastomosis performed. Donuts intact. Positive air leak test with stream of tny bubbles concerning for donnie from staple line. Laparoscopic splenic flexure mobilization and IMV taken. Anastomosis resected and redone. Positive stream of tiny bubbles on air leak test with second anastomosis. Anastomosis looked good with excellent blood flow and no tension. Donuts intact. Diverting loop ileostomy created.    POD1 Pt seen and examined at bedside. Patient comfortable and in no-apparent distress. Pain is controlled. Patient tolerating a clear liquid diet without N/v. Patient denies SOB, chest pain. Kay > L Ucath in place- take out this morning. IVF, Diet: CLD. Home meds were restarted. Patient received ostomy teaching.   POD2: Pt seen and examined at bedside. Patient comfortable and in no-apparent distress. Pain is controlled. Patient tolerating a clear liquid diet without N/v. The diet was advanced to regular food. The patient received more ostomy teaching. On exam, the ostomy had a stool and gas. His incisions were c/d/i.   POD3: Pt seen and examined at bedside. Patient comfortable and in no-apparent distress. Pain is controlled. Patient tolerating a LR diet without N/v. On exam, the ostomy had a stool and gas. His incisions were c/d/i. The red rubber was removed from his ostomy.   POD4: [place] were removed prior to discharge  Pt to be discharged with ostomy; ostomy teaching done  Physical therapy evaluated the patient, and recommended no skilled PT needs    On the day of discharge, patient's vital signs within normal limits, pain is controlled, voiding urine, passing gas/stool via ileostomy, tolerating low fiber diet, and ambulating well. Pt to f/u with Dr. Clifford in 1-2 weeks after discharge. Pt to f/u with PCP in 1-2 weeks after discharge

## 2024-04-19 NOTE — ADVANCED PRACTICE NURSE CONSULT - ASSESSMENT
Consult received, chart reviewed &events noted to date. In to see pt to on 3 separate visits to initiate ileostomy teaching & assess readiness to learn. Introduced self & role of CWOCN. Pt alert & receptive to teaching. Inquired as common practice if pt would like a support person involved in teaching & pt states his sister, Yeni is visiting from [place] & will be staying with patient while he recovers. Pt contacted sister and scheduled ostomy lesson at 11:30 am.    During initial visit, spoke w/pt regarding new fecal diversion & maintaining "normalcy" with new ileostomy. Pt became teary eyed at times. Much emotional support & encouragement provided throughout visit. Briefly reviewed anatomy & function & basics of ileostomy care & returning to "normalcy" w/new stoma creation (ie showering, clothing, hobbies,work, etc.). Pt appreciative of time & discuss education to continue when sister visited and throughout his post-op stay. Pt appreciative of time & support.   Returned later in morning as scheduled. Reintroduced self & role of CWOCN to pt/sister. Reviewed anatomy & function & basics of stoma care. Discussed expected return of bowel function. Discussed returning to "normalcy". Loop ileostomy pink &viable; pouch seal intact. Scant air in pouch w/effluent noted. Pt not ready to "look yet". Demonstrated pouching system & reviewed steps for emptying. Pt observed & asked appropriate questions. Pt returned demonstration w/open & closure of pouching system & "burping" pouch & did well. Educational material & supplies placed at bedside.   Returned later in afternoon to continue stoma teaching. Pt OOB to , sister at bedside. Pt agreeable to observe steps for emptying- pt observed steps & asked questions. Encourage pt to assist w/emptying & staff to reinforce teaching.   CWOCN  provided much emotional support, active listening & assured pt of ongoing support & teaching. Staff to reinforce teaching. Will f/u
 In to see pt to on 3 separate visits to continue ileostomy teaching with pt & sister. On assessment, pouching system intact. Pt reports "they changed it last night due to leakage". Emotional support provided. Instructed of need to empty pouch when 1/3- 1/2 full & "burp" pouch to release gas as needed. Accdg to pt he participates with "burping".  Loop ileostomy pink &viable; pouch seal intact, bilious brown drainage noted w/ some gas. Pouch was just emptied by staff, reviewed & re-demonstrated steps for emptying. Pt /sister observed & asked appropriate questions. Briefly reviewed dietary cautions, progression, importance fo chewing his foods well & maintaining hydration.  Pt encourage to participate w/ emptying & agreed w/ plans. Staff to reinforce teaching.  
In to see pt & sister to continue ileostomy teaching . Pt in bed continue to feel better, tolerating diet. Complete pouching system changed. Pt & sister observed as reviewed steps for change. Pt participated w/ closing pouch end. Loop ileostomy 1 1/2" pink& viable, os at 12 o'clock. Red rubber cath w/o tension & removed as ordered w/o difficulty. Peristomal skin slightly denuded, noted slight skin creases at 3 & 9 o'clock. Mucocutaneous junction intact. Stoma powder applied to peristomal skin to absorb moisture & dabbed w/ cavilon 3M to seal & protect skin. Repouched w/ 2 1/4" soft convex skin barrier, bead of stoma paste applied to skin creases to level surface & at the back of skin barrier to caulk& drainable pouch. Encourage to participate with  emptying pouch when 1/3- 1/2 full. Pt reports  "burping" pouch on own  to release gas as needed. Pt /sister receptive to teaching & asked appropriate questions.  Reviewed dietary cautions, progression, importance fo chewing foods well & maintaining hydration. Discussed product info number and supplies ordering process. Supplies w/ pattern left at bedside. Emotional support provided. Staff to reinforce teaching.

## 2024-04-19 NOTE — DISCHARGE NOTE PROVIDER - CARE PROVIDER_API CALL
Klaus Clifford  Colon/Rectal Surgery  [address] [place] [address], [address]  [place] [address]  Phone: [phone]  Fax: [phone]  Follow Up Time:    Klaus Clifford  Colon/Rectal Surgery  [address] [place] [address], [address]  [place] [address]  Phone: [phone]  Fax: [phone]  Follow Up Time: 1 week

## 2024-04-19 NOTE — ADVANCED PRACTICE NURSE CONSULT - RECOMMEDATIONS
Will recommend:  1. Monitor output  2. Empty pouch when 1/3-1/2 full  3. Change pouching system every 3-4 days & prn leakage  4. Reinforce ostomy teaching w/patient  5. Contact CWOCNs if questions/issues arise.  6. Supplies: [place] 2 1/4" Ceraplus flat skin barrier (#74918), Viridiana 2 1/4" drainable pouch (#93759); Accessory products:  stoma paste #[id number], stoma powder (#0595) & Cavilon No sting barrier film wipe (#8442), stoma belt #9815  Pt, staff RN & surgical team aware of plan of care. Ostomy RNs to f/u. Support & encouragement provided throughout visit.  
 Will recommend:  1. Monitor output  2. Empty pouch when 1/3-1/2 full, encourage self care.  3. Change pouching system every 3-4 days & prn leakage  4. Reinforce ostomy teaching w/patient  5. Contact ostomy specialists if questions, concern/issues arise.  6. Supplies: Viridiana 2 1/4" Ceraplus flat skin barrier (#03066), Viridiana 2 1/4" drainable pouch (#27380); Accessory products:  stoma paste #[id number], stoma powder (#0257) & Cavilon No sting barrier film wipe (#3188), stoma belt #5590  Ostomy RNs to f/u tomorrow. Support & encouragement provided throughout visit.  
 Will recommend:  1. Monitor output  2. Empty pouch when 1/3-1/2 full, encourage self care.  3. Change pouching system every 3-4 days & prn leakage  4. Reinforce ostomy teaching w/patient  5. Contact ostomy specialists if questions, concern/issues arise.  6. Supplies: Viridiana 2 1/4" Ceraplus flat skin barrier (#16677), Viridiana 2 1/4" drainable pouch (#09771); Accessory products:  stoma paste #299167, stoma powder (#7645) & Cavilon No sting barrier film wipe (#4962), stoma belt #0928  Ostomy RNs to f/u tomorrow. Support & encouragement provided throughout visit.

## 2024-04-19 NOTE — DISCHARGE NOTE PROVIDER - NSDCCPTREATMENT_GEN_ALL_CORE_FT
PRINCIPAL PROCEDURE  Procedure: Laparoscopic low anterior resection  Findings and Treatment: BATHING: Please do not submerge wound underwater. You may shower and/or sponge bathe.  ACTIVITY: No heavy lifting anything more than 10-15lbs or straining. Otherwise, you may return to your usual level of physical activity. If you are taking narcotic pain medication (such as Percocet), do NOT drive a car, operate machinery or make important decisions.  DIET: Low fiber diet  NOTIFY YOUR SURGEON IF: You have any bleeding that does not stop, any pus draining from your wound, any fever (over 100.4 F) or chills, persistent nausea/vomiting with inability to tolerate food or liquids, persistent diarrhea, or if your pain is not controlled on your discharge pain medications.  FOLLOW-UP:  1. Please call to make a follow-up appointment within one week of discharge   2. Please follow up with your primary care physician in one week regarding your hospitalization.  PAIN: A prescription for oxycodone has been sent to the pharmacy. You should only take these for severe pain. For mild or moderate pain, you may take 975mg of tylenol every 6 hours. Do not exceed more than 4G per day. You may also take ibuprofen 400mg every 6 hours. It is most effective to alternate these two medicines with each other, 3 hours apart.         SECONDARY PROCEDURE  Procedure: Creation, ileostomy  Findings and Treatment:      PRINCIPAL PROCEDURE  Procedure: Laparoscopic low anterior resection  Findings and Treatment: OSTOMY: You will be discharged with an ostomy bag. You will need to empty them and record outputs accurately. This will be taught to you by the nursing staff. Please bring to the office accurate records of output. Your output should be no less than 200 mL and no more than 1100mL. If out of range, please call the office for further recommendations.   WOUND CARE: You may cover your incisions with gauze and tape, and replace daily. Staples will be removed at follow up office visit.   BATHING: You may shower and/or sponge bathe 24. Remove outer dressing prior to shower. Let soap and water run over incision; do NOT scrub incision. Pat abdomen dry after, and replace with gauze with paper tape. Do no submerge the incision underwater for the next 2 weeks.   ACTIVITY: No heavy lifting anything more than 10-15lbs or straining. Otherwise, you may return to your usual level of physical activity. If you are taking narcotic pain medication (such as Percocet), do NOT drive a car, operate machinery or make important decisions.  DIET: Maintain Low Fiber Diet until your next appointment. Avoid raw fruits and vegetables. Thoroughly cooked vegetables that are soft and easily mashed with a fork are ok to eat. Bananas are also ok to eat.  PAIN: A prescription for oxycodone has been sent to the pharmacy. You should only take these for severe pain. For mild or moderate pain, you may take 975mg of tylenol every 6 hours. Do not exceed more than 4G per day.   NOTIFY YOUR SURGEON IF: You have any bleeding that does not stop, any pus draining from your wound, any fever (over 100.4 F) or chills, persistent nausea/vomiting with inability to tolerate food or liquids, persistent diarrhea, or if severe abdominal pain is not controlled on your discharge pain medications.  FOLLOW-UP:  1. Please call to make a follow-up appointment within one to two weeks of discharge with Dr. Clifford  2. Please follow up with your primary care physician in one week regarding your hospitalization.        SECONDARY PROCEDURE  Procedure: Creation, ileostomy  Findings and Treatment:

## 2024-04-19 NOTE — DISCHARGE NOTE NURSING/CASE MANAGEMENT/SOCIAL WORK - NSDCPEFALRISK_GEN_ALL_CORE
For information on Fall & Injury Prevention, visit: [email or web address] OR  [email or web address] OR  https://www.cdc.gov/steadi/patient.html

## 2024-04-19 NOTE — DISCHARGE NOTE PROVIDER - NSDCACTIVITY_GEN_ALL_CORE
Showering allowed/No heavy lifting/straining Do not drive or operate machinery/Showering allowed/Do not make important decisions/Stairs allowed/Walking - Indoors allowed/No heavy lifting/straining/Walking - Outdoors allowed/Follow Instructions Provided by your Surgical Team

## 2024-04-20 ENCOUNTER — NON-APPOINTMENT (OUTPATIENT)
Age: 59
End: 2024-04-20

## 2024-04-20 VITALS
SYSTOLIC BLOOD PRESSURE: 119 MMHG | HEART RATE: 76 BPM | OXYGEN SATURATION: 96 % | RESPIRATION RATE: 18 BRPM | TEMPERATURE: 99 F | DIASTOLIC BLOOD PRESSURE: 79 MMHG

## 2024-04-20 LAB
ANION GAP SERPL CALC-SCNC: 13 MMOL/L — SIGNIFICANT CHANGE UP (ref 5–17)
BUN SERPL-MCNC: 18 MG/DL — SIGNIFICANT CHANGE UP (ref 7–23)
CALCIUM SERPL-MCNC: 9.9 MG/DL — SIGNIFICANT CHANGE UP (ref 8.4–10.5)
CHLORIDE SERPL-SCNC: 103 MMOL/L — SIGNIFICANT CHANGE UP (ref 96–108)
CO2 SERPL-SCNC: 24 MMOL/L — SIGNIFICANT CHANGE UP (ref 22–31)
CREAT SERPL-MCNC: 1.11 MG/DL — SIGNIFICANT CHANGE UP (ref 0.5–1.3)
EGFR: 76 ML/MIN/1.73M2 — SIGNIFICANT CHANGE UP
GLUCOSE SERPL-MCNC: 91 MG/DL — SIGNIFICANT CHANGE UP (ref 70–99)
HCT VFR BLD CALC: 36.9 % — LOW (ref 39–50)
HGB BLD-MCNC: 12.7 G/DL — LOW (ref 13–17)
MAGNESIUM SERPL-MCNC: 2.2 MG/DL — SIGNIFICANT CHANGE UP (ref 1.6–2.6)
MCHC RBC-ENTMCNC: 30.4 PG — SIGNIFICANT CHANGE UP (ref 27–34)
MCHC RBC-ENTMCNC: 34.4 GM/DL — SIGNIFICANT CHANGE UP (ref 32–36)
MCV RBC AUTO: 88.3 FL — SIGNIFICANT CHANGE UP (ref 80–100)
NRBC # BLD: 0 /100 WBCS — SIGNIFICANT CHANGE UP (ref 0–0)
PHOSPHATE SERPL-MCNC: 2.1 MG/DL — LOW (ref 2.5–4.5)
PLATELET # BLD AUTO: 208 K/UL — SIGNIFICANT CHANGE UP (ref 150–400)
POTASSIUM SERPL-MCNC: 4.4 MMOL/L — SIGNIFICANT CHANGE UP (ref 3.5–5.3)
POTASSIUM SERPL-SCNC: 4.4 MMOL/L — SIGNIFICANT CHANGE UP (ref 3.5–5.3)
RBC # BLD: 4.18 M/UL — LOW (ref 4.2–5.8)
RBC # FLD: 13.9 % — SIGNIFICANT CHANGE UP (ref 10.3–14.5)
SODIUM SERPL-SCNC: 140 MMOL/L — SIGNIFICANT CHANGE UP (ref 135–145)
WBC # BLD: 8 K/UL — SIGNIFICANT CHANGE UP (ref 3.8–10.5)
WBC # FLD AUTO: 8 K/UL — SIGNIFICANT CHANGE UP (ref 3.8–10.5)

## 2024-04-20 PROCEDURE — 88307 TISSUE EXAM BY PATHOLOGIST: CPT

## 2024-04-20 PROCEDURE — 94640 AIRWAY INHALATION TREATMENT: CPT

## 2024-04-20 PROCEDURE — 36415 COLL VENOUS BLD VENIPUNCTURE: CPT

## 2024-04-20 PROCEDURE — C1889: CPT

## 2024-04-20 PROCEDURE — 85027 COMPLETE CBC AUTOMATED: CPT

## 2024-04-20 PROCEDURE — 82962 GLUCOSE BLOOD TEST: CPT

## 2024-04-20 PROCEDURE — 97116 GAIT TRAINING THERAPY: CPT

## 2024-04-20 PROCEDURE — 97161 PT EVAL LOW COMPLEX 20 MIN: CPT

## 2024-04-20 PROCEDURE — C1758: CPT

## 2024-04-20 PROCEDURE — C9399: CPT

## 2024-04-20 PROCEDURE — C1769: CPT

## 2024-04-20 PROCEDURE — 83735 ASSAY OF MAGNESIUM: CPT

## 2024-04-20 PROCEDURE — 80048 BASIC METABOLIC PNL TOTAL CA: CPT

## 2024-04-20 PROCEDURE — 97530 THERAPEUTIC ACTIVITIES: CPT

## 2024-04-20 PROCEDURE — 84100 ASSAY OF PHOSPHORUS: CPT

## 2024-04-20 RX ORDER — SUVOREXANT 20 MG/1
1 TABLET, FILM COATED ORAL
Refills: 0 | DISCHARGE

## 2024-04-20 RX ORDER — GABAPENTIN 400 MG/1
0 CAPSULE ORAL
Qty: 0 | Refills: 0 | DISCHARGE

## 2024-04-20 RX ORDER — CETIRIZINE HYDROCHLORIDE 10 MG/1
1 TABLET ORAL
Refills: 0 | DISCHARGE

## 2024-04-20 RX ORDER — DICLOFENAC SODIUM 75 MG/1
0 TABLET, DELAYED RELEASE ORAL
Qty: 0 | Refills: 0 | DISCHARGE

## 2024-04-20 RX ORDER — OXYCODONE HYDROCHLORIDE 5 MG/1
1 TABLET ORAL
Qty: 12 | Refills: 0
Start: 2024-04-20 | End: 2024-04-22

## 2024-04-20 RX ORDER — GABAPENTIN 400 MG/1
1 CAPSULE ORAL
Qty: 14 | Refills: 0
Start: 2024-04-20 | End: 2024-05-03

## 2024-04-20 RX ORDER — MOMETASONE FUROATE 50 UG/1
2 SPRAY NASAL
Refills: 0 | DISCHARGE

## 2024-04-20 RX ORDER — SODIUM,POTASSIUM PHOSPHATES 278-250MG
2 POWDER IN PACKET (EA) ORAL ONCE
Refills: 0 | Status: COMPLETED | OUTPATIENT
Start: 2024-04-20 | End: 2024-04-20

## 2024-04-20 RX ORDER — ACETAMINOPHEN 500 MG
2 TABLET ORAL
Qty: 0 | Refills: 0 | DISCHARGE
Start: 2024-04-20

## 2024-04-20 RX ADMIN — Medication 1000 MILLIGRAM(S): at 06:04

## 2024-04-20 RX ADMIN — HEPARIN SODIUM 5000 UNIT(S): 5000 INJECTION INTRAVENOUS; SUBCUTANEOUS at 13:02

## 2024-04-20 RX ADMIN — Medication 2 PACKET(S): at 13:12

## 2024-04-20 RX ADMIN — Medication 20 MILLIGRAM(S): at 06:06

## 2024-04-20 RX ADMIN — Medication 81 MILLIGRAM(S): at 12:57

## 2024-04-20 RX ADMIN — Medication 1000 MILLIGRAM(S): at 06:50

## 2024-04-20 RX ADMIN — Medication 1000 MILLIGRAM(S): at 12:58

## 2024-04-20 RX ADMIN — Medication 300 MILLIGRAM(S): at 12:57

## 2024-04-20 RX ADMIN — BUDESONIDE AND FORMOTEROL FUMARATE DIHYDRATE 2 PUFF(S): 160; 4.5 AEROSOL RESPIRATORY (INHALATION) at 06:06

## 2024-04-20 RX ADMIN — HEPARIN SODIUM 5000 UNIT(S): 5000 INJECTION INTRAVENOUS; SUBCUTANEOUS at 03:51

## 2024-04-20 RX ADMIN — PANTOPRAZOLE SODIUM 40 MILLIGRAM(S): 20 TABLET, DELAYED RELEASE ORAL at 06:05

## 2024-04-20 NOTE — PROGRESS NOTE ADULT - ASSESSMENT
59 yo male, PMH NETO, insomnia, IBS, GERD, asthma - well controlled, nephrolithiasis s/p ureteroscopy, bladder CA s/p TURBT presents with h/o diverticulitis and had 4 bouts within a year. The patient is now s/p  Laparoscopic Low Anterior Resection and Diverting loop ileostomy on 4/16/24. The patient is recovering appropriately and ready for discharge today.     Plan:     -ERP   - LRD   -Pain/nausea control PRN  -Home meds  -Incentive spirometer/OOB/Ambulate  -DVT PPX  -F/u am labs    Red Surgery 
59 yo male, PMH NETO, insomnia, IBS, GERD, asthma - well controlled, nephrolithiasis s/p ureteroscopy, bladder CA s/p TURBT presents with h/o diverticulitis and had 4 bouts within a year. The patient is now s/p  Laparoscopic Low Anterior Resection and Diverting loop ileostomy on 4/16/24. The patient is recovering appropriately.     Plan:     ERP   IVF, Diet: CLD  Pain/nausea control PRN  Home meds  Incentive spirometer/OOB/Ambulate  DVT PPX  F/u am labs    Red Surgery 
59 yo male, PMH NETO, insomnia, IBS, GERD, asthma - well controlled, nephrolithiasis s/p ureteroscopy, bladder CA s/p TURBT presents with h/o diverticulitis and had 4 bouts within a year. The patient is now s/p  Laparoscopic Low Anterior Resection and Diverting loop ileostomy on 4/16/24. The patient is recovering appropriately.     Plan:     ERP  Kay > L Ucath in place- take out this morning   IVF, Diet: CLD  Pain/nausea control PRN  Home meds  Incentive spirometer/OOB/Ambulate  DVT PPX  F/u am labs    Red Surgery 
57 yo male, PMH NETO, insomnia, IBS, GERD, asthma - well controlled, nephrolithiasis s/p ureteroscopy, bladder CA s/p TURBT presents with h/o diverticulitis and had 4 bouts within a year. The patient is now s/p  Laparoscopic Low Anterior Resection and Diverting loop ileostomy on 4/16/24. The patient is recovering appropriately and ready for discharge today.     Plan:   -Diet: LRD  -Pain/nausea control PRN  -Home meds  -Incentive spirometer/OOB/Ambulate  -DVT PPX  -Home today     Red Surgery

## 2024-04-20 NOTE — PROGRESS NOTE ADULT - SUBJECTIVE AND OBJECTIVE BOX
Anesthesia Pain Management Service    SUBJECTIVE:  Pain Scale Score:          [X] Refer to charted pain scores    THERAPY: Received PF spinal morphine as above    OBJECTIVE:    Sedation:        	[X] Alert	[ ] Drowsy	[ ] Arousable      [ ] Asleep       [ ] Unresponsive    Side Effects:	[X] None	[ ] Nausea	[ ] Vomiting         [ ] Pruritus  		[ ] Weakness            [ ] Numbness	          [ ] Other:    ASSESSMENT/ PLAN  [X] Patient transitioned to prn analgesics  [X] Pain management per primary service, pain service to sign off   [X]Documentation and Verification of current medications    Chris Whitfield DO  Anesthesia
COLORECTAL SURGERY DAILY PROGRESS NOTE:     24h: No acute events over night   red rubber removed from stoma     SUBJECTIVE/ROS: Patient seen and examined. Patient feels well. Tolerating diet. Pain is controlled. Ambulating without difficulty.   Denies nausea, vomiting, chest pain, shortness of breath     MEDICATIONS  (STANDING):  acetaminophen     Tablet .. 1000 milliGRAM(s) Oral every 6 hours  aspirin  chewable 81 milliGRAM(s) Oral daily  budesonide  80 MICROgram(s)/formoterol 4.5 MICROgram(s) Inhaler 2 Puff(s) Inhalation two times a day  chlorhexidine 2% Cloths 1 Application(s) Topical once  ciprofloxacin   IVPB 400 milliGRAM(s) IV Intermittent once  clindamycin IVPB 900 milliGRAM(s) IV Intermittent once  dicyclomine 20 milliGRAM(s) Oral two times a day before meals  ethambutol 400 milliGRAM(s) Oral <User Schedule>  fenofibrate Tablet 145 milliGRAM(s) Oral at bedtime  gabapentin 800 milliGRAM(s) Oral at bedtime  heparin   Injectable 5000 Unit(s) SubCutaneous every 8 hours  isoniazid 300 milliGRAM(s) Oral daily  montelukast 10 milliGRAM(s) Oral at bedtime  pantoprazole    Tablet 40 milliGRAM(s) Oral before breakfast  traZODone 50 milliGRAM(s) Oral at bedtime    MEDICATIONS  (PRN):  oxyCODONE    IR 2.5 milliGRAM(s) Oral every 4 hours PRN Moderate Pain (4 - 6)  oxyCODONE    IR 5 milliGRAM(s) Oral every 4 hours PRN Severe Pain (7 - 10)      OBJECTIVE:  Vital Signs Last 24 Hrs  T(C): 37 (20 Apr 2024 04:50), Max: 37 (19 Apr 2024 17:05)  T(F): 98.6 (20 Apr 2024 04:50), Max: 98.6 (19 Apr 2024 17:05)  HR: 77 (20 Apr 2024 04:50) (70 - 86)  BP: 134/84 (20 Apr 2024 04:50) (118/78 - 143/81)  BP(mean): --  RR: 18 (20 Apr 2024 04:50) (18 - 18)  SpO2: 95% (20 Apr 2024 04:50) (95% - 98%)    Parameters below as of 20 Apr 2024 04:50  Patient On (Oxygen Delivery Method): room air      I&O's Detail    19 Apr 2024 07:01  -  20 Apr 2024 07:00  --------------------------------------------------------  IN:    Oral Fluid: 480 mL  Total IN: 480 mL    OUT:    Blood Loss (mL): 0 mL    Ileostomy (mL): 975 mL    Voided (mL): 1650 mL  Total OUT: 2625 mL  Total NET: -2145 mL    LABS:                        12.3   9.32  )-----------( 179      ( 19 Apr 2024 06:50 )             37.2     04-19    139  |  103  |  11  ----------------------------<  86  4.2   |  23  |  1.00    Ca    9.6      19 Apr 2024 06:51  Phos  1.4     04-19  Mg     2.1     04-19        Urinalysis Basic - ( 19 Apr 2024 06:51 )  Color: x / Appearance: x / SG: x / pH: x  Gluc: 86 mg/dL / Ketone: x  / Bili: x / Urobili: x   Blood: x / Protein: x / Nitrite: x   Leuk Esterase: x / RBC: x / WBC x   Sq Epi: x / Non Sq Epi: x / Bacteria: x    PHYSICAL EXAM:  General Appearance: Appears well, NAD  Respiratory: No labored breathing  CV: Pulse regularly present  Abdomen: Soft, nontender, incision c/d/i, ostomy with gas and stool     
Day 1 of Anesthesia Pain Management Service    SUBJECTIVE: Doing ok  Pain Scale Score:          [X] Refer to charted pain scores    THERAPY:    s/p    200 mcg PF morphine on 4\16\2023      MEDICATIONS  (STANDING):  acetaminophen   IVPB .. 1000 milliGRAM(s) IV Intermittent every 6 hours  aspirin  chewable 81 milliGRAM(s) Oral daily  chlorhexidine 2% Cloths 1 Application(s) Topical once  ciprofloxacin   IVPB 400 milliGRAM(s) IV Intermittent once  clindamycin IVPB 900 milliGRAM(s) IV Intermittent once  heparin   Injectable 5000 Unit(s) SubCutaneous every 8 hours  lactated ringers. 1000 milliLiter(s) (40 mL/Hr) IV Continuous <Continuous>  morphine PF Spinal 0.2 milliGRAM(s) IntraThecal. once  pantoprazole    Tablet 40 milliGRAM(s) Oral before breakfast    MEDICATIONS  (PRN):  nalbuphine Injectable 2.5 milliGRAM(s) IV Push every 6 hours PRN Pruritus  naloxone Injectable 0.1 milliGRAM(s) IV Push every 3 minutes PRN For ANY of the following changes in patient status:  A. RR LESS THAN 10 breaths per minute, B. Oxygen saturation LESS THAN 90%, C. Sedation score of 6  ondansetron Injectable 4 milliGRAM(s) IV Push every 6 hours PRN Nausea  oxyCODONE    IR 5 milliGRAM(s) Oral every 4 hours PRN Severe Pain (7 - 10)  oxyCODONE    IR 2.5 milliGRAM(s) Oral every 4 hours PRN Moderate Pain (4 - 6)      OBJECTIVE:    Sedation:        	[X] Alert	 [ ] Drowsy	[ ] Arousable      [ ] Asleep       [ ] Unresponsive    Side Effects:	[ ] None 	[ ] Nausea	[ ] Vomiting         [X ] Pruritus: Nubain prn  		[ ] Weakness            [ ] Numbness	          [ ] Other:    Vital Signs Last 24 Hrs  T(C): 37.2 (17 Apr 2024 05:41), Max: 37.2 (16 Apr 2024 18:55)  T(F): 98.9 (17 Apr 2024 05:41), Max: 99 (16 Apr 2024 18:55)  HR: 68 (17 Apr 2024 05:41) (64 - 80)  BP: 111/70 (17 Apr 2024 05:41) (97/54 - 139/75)  BP(mean): 84 (16 Apr 2024 23:00) (71 - 100)  RR: 18 (17 Apr 2024 05:41) (16 - 18)  SpO2: 97% (17 Apr 2024 05:41) (93% - 100%)    Parameters below as of 17 Apr 2024 05:41  Patient On (Oxygen Delivery Method): room air        ASSESSMENT/ PLAN  [X] Patient to be transitioned to prn analgesics today  [X] Pain management per primary service, pain service to sign off   [X]Documentation and Verification of current medications
Surgery Progress Note    OVERNIGHT EVENTS: NAEO    SUBJECTIVE: Pt seen and examined at bedside. Patient comfortable and in no-apparent distress. Pain is controlled. Patient tolerating a clear liquid diet without N/v    Vital Signs Last 24 Hrs  T(C): 37.2 (18 Apr 2024 04:20), Max: 37.7 (17 Apr 2024 21:55)  T(F): 99 (18 Apr 2024 04:20), Max: 99.8 (17 Apr 2024 21:55)  HR: 75 (18 Apr 2024 04:20) (66 - 83)  BP: 142/84 (18 Apr 2024 04:20) (91/54 - 146/80)  BP(mean): --  RR: 18 (18 Apr 2024 04:20) (18 - 18)  SpO2: 96% (18 Apr 2024 04:20) (94% - 99%)    Parameters below as of 18 Apr 2024 04:20  Patient On (Oxygen Delivery Method): room air        PHYSICAL EXAM:  General Appearance: Appears well, NAD  Respiratory: No labored breathing  CV: Pulse regularly present  Abdomen: Soft, nontender, incision c/d/i, ostomy with gas and stool, no leaking     INs and OUTs:    04-16-24 @ 07:01  -  04-17-24 @ 07:00  --------------------------------------------------------  IN: 780 mL / OUT: 980 mL / NET: -200 mL    04-17-24 @ 07:01  -  04-18-24 @ 05:55  --------------------------------------------------------  IN: 2260 mL / OUT: 2125 mL / NET: 135 mL        LABS:                        12.9   8.48  )-----------( 169      ( 17 Apr 2024 07:39 )             38.6     04-17    140  |  105  |  13  ----------------------------<  112<H>  4.1   |  24  |  1.14    Ca    9.0      17 Apr 2024 07:43  Phos  2.7     04-17  Mg     2.2     04-17        Urinalysis Basic - ( 17 Apr 2024 07:43 )    Color: x / Appearance: x / SG: x / pH: x  Gluc: 112 mg/dL / Ketone: x  / Bili: x / Urobili: x   Blood: x / Protein: x / Nitrite: x   Leuk Esterase: x / RBC: x / WBC x   Sq Epi: x / Non Sq Epi: x / Bacteria: x        
Surgery Progress Note    OVERNIGHT EVENTS: NAEO    SUBJECTIVE: Pt seen and examined at bedside. Patient comfortable and in no-apparent distress. Pain is controlled. Patient tolerating a clear liquid diet without N/v. Patient denies SOB, chest pain.     Vital Signs Last 24 Hrs  T(C): 37.2 (17 Apr 2024 05:41), Max: 37.2 (16 Apr 2024 18:55)  T(F): 98.9 (17 Apr 2024 05:41), Max: 99 (16 Apr 2024 18:55)  HR: 68 (17 Apr 2024 05:41) (64 - 80)  BP: 111/70 (17 Apr 2024 05:41) (97/54 - 139/75)  BP(mean): 84 (16 Apr 2024 23:00) (71 - 100)  RR: 18 (17 Apr 2024 05:41) (16 - 18)  SpO2: 97% (17 Apr 2024 05:41) (93% - 100%)    Parameters below as of 17 Apr 2024 05:41  Patient On (Oxygen Delivery Method): room air        PHYSICAL EXAM:  General Appearance: Appears well, NAD  Respiratory: No labored breathing  CV: Pulse regularly present  Abdomen: Soft, nontender, incision c/d/i, ostomy pink and c/d/i -/-    INs and OUTs:    04-16-24 @ 07:01  -  04-17-24 @ 05:50  --------------------------------------------------------  IN: 220 mL / OUT: 980 mL / NET: -760 mL                    
Surgery Progress Note    OVERNIGHT EVENTS: NAEO    SUBJECTIVE: Pt seen and examined at bedside. Patient comfortable and in no-apparent distress. Pain is controlled. Patient tolerating a LR diet without N/v    Vital Signs Last 24 Hrs  T(C): 37.5 (19 Apr 2024 00:26), Max: 37.5 (19 Apr 2024 00:26)  T(F): 99.5 (19 Apr 2024 00:26), Max: 99.5 (19 Apr 2024 00:26)  HR: 88 (19 Apr 2024 00:26) (68 - 89)  BP: 137/82 (19 Apr 2024 00:26) (134/84 - 148/84)  BP(mean): --  RR: 18 (19 Apr 2024 00:26) (18 - 18)  SpO2: 92% (19 Apr 2024 00:26) (92% - 98%)    Parameters below as of 19 Apr 2024 00:26  Patient On (Oxygen Delivery Method): room air        PHYSICAL EXAM:  General Appearance: Appears well, NAD  Respiratory: No labored breathing  CV: Pulse regularly present  Abdomen: Soft, nontender, incision c/d/i, ostomy with gas and stool     INs and OUTs:    04-17-24 @ 07:01  -  04-18-24 @ 07:00  --------------------------------------------------------  IN: 2260 mL / OUT: 2125 mL / NET: 135 mL    04-18-24 @ 07:01  -  04-19-24 @ 05:31  --------------------------------------------------------  IN: 1440 mL / OUT: 2550 mL / NET: -1110 mL        LABS:                        12.2   9.86  )-----------( 171      ( 18 Apr 2024 07:01 )             37.1     04-18    140  |  105  |  10  ----------------------------<  86  4.0   |  25  |  0.97    Ca    9.4      18 Apr 2024 06:59  Phos  1.2     04-18  Mg     2.2     04-18        Urinalysis Basic - ( 18 Apr 2024 06:59 )    Color: x / Appearance: x / SG: x / pH: x  Gluc: 86 mg/dL / Ketone: x  / Bili: x / Urobili: x   Blood: x / Protein: x / Nitrite: x   Leuk Esterase: x / RBC: x / WBC x   Sq Epi: x / Non Sq Epi: x / Bacteria: x

## 2024-04-22 ENCOUNTER — NON-APPOINTMENT (OUTPATIENT)
Age: 59
End: 2024-04-22

## 2024-04-22 PROBLEM — G47.33 OBSTRUCTIVE SLEEP APNEA (ADULT) (PEDIATRIC): Chronic | Status: ACTIVE | Noted: 2024-03-29

## 2024-04-22 PROBLEM — C67.9 MALIGNANT NEOPLASM OF BLADDER, UNSPECIFIED: Chronic | Status: ACTIVE | Noted: 2024-03-29

## 2024-04-22 PROBLEM — R78.81 BACTEREMIA: Chronic | Status: ACTIVE | Noted: 2024-03-29

## 2024-04-22 PROBLEM — U07.1 COVID-19: Chronic | Status: ACTIVE | Noted: 2024-03-29

## 2024-04-22 PROBLEM — E78.5 HYPERLIPIDEMIA, UNSPECIFIED: Chronic | Status: ACTIVE | Noted: 2024-03-29

## 2024-04-22 PROBLEM — A15.9 RESPIRATORY TUBERCULOSIS UNSPECIFIED: Chronic | Status: ACTIVE | Noted: 2024-03-29

## 2024-04-22 PROBLEM — J45.909 UNSPECIFIED ASTHMA, UNCOMPLICATED: Chronic | Status: ACTIVE | Noted: 2024-03-29

## 2024-04-23 ENCOUNTER — NON-APPOINTMENT (OUTPATIENT)
Age: 59
End: 2024-04-23

## 2024-04-23 LAB — SURGICAL PATHOLOGY STUDY: SIGNIFICANT CHANGE UP

## 2024-05-01 ENCOUNTER — APPOINTMENT (OUTPATIENT)
Dept: COLORECTAL SURGERY | Facility: CLINIC | Age: 59
End: 2024-05-01
Payer: COMMERCIAL

## 2024-05-01 PROCEDURE — 99024 POSTOP FOLLOW-UP VISIT: CPT

## 2024-05-02 NOTE — HISTORY OF PRESENT ILLNESS
[FreeTextEntry1] : Pleasant 58-year-old gentleman who presents for his first postoperative visit.  Patient is approximately 15 days status post laparoscopic anterior resection with diverting ileostomy for recurrent diverticulitis.  He had a rapid and uneventful postoperative recuperation.  Final pathology was consistent with diverticular disease.  Currently the patient is adapting to life with a temporary ileostomy.  He is tolerating diet and has no significant incisional pain.  He denies temperature elevation or any blood per rectum.  Physical examination reveals a protuberant abdomen.  His trocar sites and specimen extraction site are healing nicely with no evidence of infection.  Surgical staples were removed.  He has a well adherent ostomy appliance in place on his right lower quadrant loop ileostomy which appears pink and viable and functioning well.  Patient and his sister were assured that he is making an excellent recuperation.  He may expand his diet to include fruits and vegetables.  I will see him in approximately one month for sigmoidoscopy under sedation to assess his colorectal anastomosis.

## 2024-05-15 RX ORDER — NEOMYCIN SULFATE 500 MG/1
500 TABLET ORAL
Qty: 3 | Refills: 0 | Status: DISCONTINUED | COMMUNITY
Start: 2024-03-27 | End: 2024-05-15

## 2024-05-15 RX ORDER — METRONIDAZOLE 250 MG/1
250 TABLET ORAL
Qty: 3 | Refills: 0 | Status: DISCONTINUED | COMMUNITY
Start: 2024-03-27 | End: 2024-05-15

## 2024-05-29 ENCOUNTER — APPOINTMENT (OUTPATIENT)
Dept: COLORECTAL SURGERY | Facility: CLINIC | Age: 59
End: 2024-05-29
Payer: COMMERCIAL

## 2024-05-29 PROCEDURE — 45330 DIAGNOSTIC SIGMOIDOSCOPY: CPT | Mod: 58

## 2024-06-21 ENCOUNTER — APPOINTMENT (OUTPATIENT)
Dept: RADIOLOGY | Facility: HOSPITAL | Age: 59
End: 2024-06-21

## 2024-06-21 ENCOUNTER — OUTPATIENT (OUTPATIENT)
Dept: OUTPATIENT SERVICES | Facility: HOSPITAL | Age: 59
LOS: 1 days | End: 2024-06-21
Payer: COMMERCIAL

## 2024-06-21 ENCOUNTER — RESULT REVIEW (OUTPATIENT)
Age: 59
End: 2024-06-21

## 2024-06-21 VITALS
HEART RATE: 73 BPM | TEMPERATURE: 98 F | OXYGEN SATURATION: 97 % | SYSTOLIC BLOOD PRESSURE: 102 MMHG | DIASTOLIC BLOOD PRESSURE: 69 MMHG | RESPIRATION RATE: 18 BRPM | HEIGHT: 70 IN | WEIGHT: 203.93 LBS

## 2024-06-21 DIAGNOSIS — Z90.89 ACQUIRED ABSENCE OF OTHER ORGANS: Chronic | ICD-10-CM

## 2024-06-21 DIAGNOSIS — Z98.890 OTHER SPECIFIED POSTPROCEDURAL STATES: Chronic | ICD-10-CM

## 2024-06-21 DIAGNOSIS — G47.33 OBSTRUCTIVE SLEEP APNEA (ADULT) (PEDIATRIC): ICD-10-CM

## 2024-06-21 DIAGNOSIS — Z29.9 ENCOUNTER FOR PROPHYLACTIC MEASURES, UNSPECIFIED: ICD-10-CM

## 2024-06-21 DIAGNOSIS — K57.92 DIVERTICULITIS OF INTESTINE, PART UNSPECIFIED, WITHOUT PERFORATION OR ABSCESS WITHOUT BLEEDING: ICD-10-CM

## 2024-06-21 DIAGNOSIS — Z01.818 ENCOUNTER FOR OTHER PREPROCEDURAL EXAMINATION: ICD-10-CM

## 2024-06-21 DIAGNOSIS — K57.32 DIVERTICULITIS OF LARGE INTESTINE WITHOUT PERFORATION OR ABSCESS WITHOUT BLEEDING: ICD-10-CM

## 2024-06-21 LAB
ANION GAP SERPL CALC-SCNC: 12 MMOL/L — SIGNIFICANT CHANGE UP (ref 5–17)
BUN SERPL-MCNC: 19 MG/DL — SIGNIFICANT CHANGE UP (ref 7–23)
CALCIUM SERPL-MCNC: 10.7 MG/DL — HIGH (ref 8.4–10.5)
CHLORIDE SERPL-SCNC: 101 MMOL/L — SIGNIFICANT CHANGE UP (ref 96–108)
CO2 SERPL-SCNC: 23 MMOL/L — SIGNIFICANT CHANGE UP (ref 22–31)
CREAT SERPL-MCNC: 1.46 MG/DL — HIGH (ref 0.5–1.3)
EGFR: 55 ML/MIN/1.73M2 — LOW
GLUCOSE SERPL-MCNC: 109 MG/DL — HIGH (ref 70–99)
HCT VFR BLD CALC: 43.6 % — SIGNIFICANT CHANGE UP (ref 39–50)
HCV AB S/CO SERPL IA: 0.08 S/CO — SIGNIFICANT CHANGE UP (ref 0–0.99)
HCV AB SERPL-IMP: SIGNIFICANT CHANGE UP
HGB BLD-MCNC: 14.3 G/DL — SIGNIFICANT CHANGE UP (ref 13–17)
MCHC RBC-ENTMCNC: 29.2 PG — SIGNIFICANT CHANGE UP (ref 27–34)
MCHC RBC-ENTMCNC: 32.8 GM/DL — SIGNIFICANT CHANGE UP (ref 32–36)
MCV RBC AUTO: 89 FL — SIGNIFICANT CHANGE UP (ref 80–100)
NRBC # BLD: 0 /100 WBCS — SIGNIFICANT CHANGE UP (ref 0–0)
PLATELET # BLD AUTO: 243 K/UL — SIGNIFICANT CHANGE UP (ref 150–400)
POTASSIUM SERPL-MCNC: 5.3 MMOL/L — SIGNIFICANT CHANGE UP (ref 3.5–5.3)
POTASSIUM SERPL-SCNC: 5.3 MMOL/L — SIGNIFICANT CHANGE UP (ref 3.5–5.3)
RBC # BLD: 4.9 M/UL — SIGNIFICANT CHANGE UP (ref 4.2–5.8)
RBC # FLD: 14.6 % — HIGH (ref 10.3–14.5)
SODIUM SERPL-SCNC: 136 MMOL/L — SIGNIFICANT CHANGE UP (ref 135–145)
WBC # BLD: 5.51 K/UL — SIGNIFICANT CHANGE UP (ref 3.8–10.5)
WBC # FLD AUTO: 5.51 K/UL — SIGNIFICANT CHANGE UP (ref 3.8–10.5)

## 2024-06-21 PROCEDURE — 74270 X-RAY XM COLON 1CNTRST STD: CPT

## 2024-06-21 PROCEDURE — 80048 BASIC METABOLIC PNL TOTAL CA: CPT

## 2024-06-21 PROCEDURE — 74270 X-RAY XM COLON 1CNTRST STD: CPT | Mod: 26

## 2024-06-21 PROCEDURE — 86803 HEPATITIS C AB TEST: CPT

## 2024-06-21 PROCEDURE — G0463: CPT

## 2024-06-21 PROCEDURE — 85027 COMPLETE CBC AUTOMATED: CPT

## 2024-06-21 RX ORDER — TRAZODONE HCL 50 MG
0 TABLET ORAL
Qty: 0 | Refills: 0 | DISCHARGE

## 2024-06-21 RX ORDER — PYRIDOXINE HCL (VITAMIN B6) 100 MG
0 TABLET ORAL
Qty: 0 | Refills: 3 | DISCHARGE

## 2024-06-21 RX ORDER — OMEPRAZOLE 10 MG/1
0 CAPSULE, DELAYED RELEASE ORAL
Qty: 0 | Refills: 89 | DISCHARGE

## 2024-06-21 RX ORDER — CIPROFLOXACIN HCL 250 MG
400 TABLET ORAL ONCE
Refills: 0 | Status: COMPLETED | OUTPATIENT
Start: 2024-07-11 | End: 2024-07-11

## 2024-06-21 RX ORDER — MONTELUKAST 4 MG/1
0 TABLET, CHEWABLE ORAL
Qty: 0 | Refills: 0 | DISCHARGE

## 2024-06-21 RX ORDER — HEXAVITAMINS
1 TABLET ORAL
Refills: 0 | DISCHARGE

## 2024-06-21 RX ORDER — ASPIRIN/CALCIUM CARB/MAGNESIUM 324 MG
0 TABLET ORAL
Refills: 0 | DISCHARGE

## 2024-06-21 RX ORDER — FENOFIBRATE,MICRONIZED 130 MG
0 CAPSULE ORAL
Qty: 0 | Refills: 0 | DISCHARGE

## 2024-06-21 RX ORDER — FLUTICASONE PROPIONATE AND SALMETEROL 50; 250 UG/1; UG/1
1 POWDER ORAL; RESPIRATORY (INHALATION)
Refills: 0 | DISCHARGE

## 2024-06-21 RX ORDER — CLINDAMYCIN PHOSPHATE 150 MG/ML
900 VIAL (ML) INJECTION ONCE
Refills: 0 | Status: COMPLETED | OUTPATIENT
Start: 2024-07-11 | End: 2024-07-11

## 2024-06-21 RX ORDER — ETHAMBUTOL HYDROCHLORIDE 400 MG/1
1 TABLET, FILM COATED ORAL
Refills: 0 | DISCHARGE

## 2024-06-21 RX ORDER — RIFAMPIN 300 MG
0 CAPSULE ORAL
Refills: 0 | DISCHARGE

## 2024-06-21 RX ORDER — PANTOPRAZOLE SODIUM 20 MG/1
0 TABLET, DELAYED RELEASE ORAL
Qty: 0 | Refills: 0 | DISCHARGE

## 2024-07-10 ENCOUNTER — TRANSCRIPTION ENCOUNTER (OUTPATIENT)
Age: 59
End: 2024-07-10

## 2024-07-11 ENCOUNTER — APPOINTMENT (OUTPATIENT)
Dept: COLORECTAL SURGERY | Facility: HOSPITAL | Age: 59
End: 2024-07-11
Payer: COMMERCIAL

## 2024-07-11 ENCOUNTER — RESULT REVIEW (OUTPATIENT)
Age: 59
End: 2024-07-11

## 2024-07-11 ENCOUNTER — INPATIENT (INPATIENT)
Facility: HOSPITAL | Age: 59
LOS: 1 days | Discharge: HOME | DRG: 392 | End: 2024-07-13
Attending: SURGERY | Admitting: SURGERY
Payer: COMMERCIAL

## 2024-07-11 VITALS
WEIGHT: 203.93 LBS | OXYGEN SATURATION: 99 % | DIASTOLIC BLOOD PRESSURE: 77 MMHG | SYSTOLIC BLOOD PRESSURE: 121 MMHG | RESPIRATION RATE: 17 BRPM | HEART RATE: 72 BPM | TEMPERATURE: 98 F | HEIGHT: 70 IN

## 2024-07-11 DIAGNOSIS — Z98.890 OTHER SPECIFIED POSTPROCEDURAL STATES: Chronic | ICD-10-CM

## 2024-07-11 DIAGNOSIS — Z90.89 ACQUIRED ABSENCE OF OTHER ORGANS: Chronic | ICD-10-CM

## 2024-07-11 DIAGNOSIS — K57.32 DIVERTICULITIS OF LARGE INTESTINE WITHOUT PERFORATION OR ABSCESS WITHOUT BLEEDING: ICD-10-CM

## 2024-07-11 LAB
ANION GAP SERPL CALC-SCNC: 10 MMOL/L — SIGNIFICANT CHANGE UP (ref 5–17)
BUN SERPL-MCNC: 14 MG/DL — SIGNIFICANT CHANGE UP (ref 7–23)
CALCIUM SERPL-MCNC: 10 MG/DL — SIGNIFICANT CHANGE UP (ref 8.4–10.5)
CHLORIDE SERPL-SCNC: 103 MMOL/L — SIGNIFICANT CHANGE UP (ref 96–108)
CO2 SERPL-SCNC: 23 MMOL/L — SIGNIFICANT CHANGE UP (ref 22–31)
CREAT SERPL-MCNC: 1.13 MG/DL — SIGNIFICANT CHANGE UP (ref 0.5–1.3)
EGFR: 75 ML/MIN/1.73M2 — SIGNIFICANT CHANGE UP
EGFR: 75 ML/MIN/1.73M2 — SIGNIFICANT CHANGE UP
GLUCOSE SERPL-MCNC: 94 MG/DL — SIGNIFICANT CHANGE UP (ref 70–99)
POTASSIUM SERPL-MCNC: 4.9 MMOL/L — SIGNIFICANT CHANGE UP (ref 3.5–5.3)
POTASSIUM SERPL-SCNC: 4.9 MMOL/L — SIGNIFICANT CHANGE UP (ref 3.5–5.3)
SODIUM SERPL-SCNC: 136 MMOL/L — SIGNIFICANT CHANGE UP (ref 135–145)

## 2024-07-11 PROCEDURE — 88304 TISSUE EXAM BY PATHOLOGIST: CPT | Mod: 26

## 2024-07-11 PROCEDURE — 44625 REPAIR BOWEL OPENING: CPT | Mod: 58

## 2024-07-11 DEVICE — STAPLER ETHICON PROXIMATE 75MM WITH BLUE RELOAD: Type: IMPLANTABLE DEVICE | Status: FUNCTIONAL

## 2024-07-11 DEVICE — STAPLER COVIDIEN TA 90 BLUE: Type: IMPLANTABLE DEVICE | Status: FUNCTIONAL

## 2024-07-11 RX ORDER — OXYCODONE HYDROCHLORIDE 30 MG/1
5 TABLET ORAL EVERY 4 HOURS
Refills: 0 | Status: DISCONTINUED | OUTPATIENT
Start: 2024-07-11 | End: 2024-07-13

## 2024-07-11 RX ORDER — NALOXONE HYDROCHLORIDE 0.4 MG/ML
0.1 INJECTION, SOLUTION INTRAMUSCULAR; INTRAVENOUS; SUBCUTANEOUS
Refills: 0 | Status: DISCONTINUED | OUTPATIENT
Start: 2024-07-11 | End: 2024-07-12

## 2024-07-11 RX ORDER — TRAZODONE HCL 100 MG
50 TABLET ORAL AT BEDTIME
Refills: 0 | Status: DISCONTINUED | OUTPATIENT
Start: 2024-07-11 | End: 2024-07-13

## 2024-07-11 RX ORDER — OXYCODONE HYDROCHLORIDE 30 MG/1
2.5 TABLET ORAL EVERY 4 HOURS
Refills: 0 | Status: DISCONTINUED | OUTPATIENT
Start: 2024-07-11 | End: 2024-07-13

## 2024-07-11 RX ORDER — PYRIDOXINE HCL (VITAMIN B6) 25 MG
50 TABLET ORAL DAILY
Refills: 0 | Status: DISCONTINUED | OUTPATIENT
Start: 2024-07-11 | End: 2024-07-13

## 2024-07-11 RX ORDER — ONDANSETRON HCL/PF 4 MG/2 ML
4 VIAL (ML) INJECTION EVERY 6 HOURS
Refills: 0 | Status: DISCONTINUED | OUTPATIENT
Start: 2024-07-11 | End: 2024-07-12

## 2024-07-11 RX ORDER — FLUTICASONE PROPIONATE 50 UG/1
2 SPRAY, METERED NASAL ONCE
Refills: 0 | Status: DISCONTINUED | OUTPATIENT
Start: 2024-07-12 | End: 2024-07-13

## 2024-07-11 RX ORDER — GABAPENTIN 400 MG/1
1000 CAPSULE ORAL DAILY
Refills: 0 | Status: DISCONTINUED | OUTPATIENT
Start: 2024-07-12 | End: 2024-07-12

## 2024-07-11 RX ORDER — FENTANYL CITRATE-0.9 % NACL/PF 100MCG/2ML
25 SYRINGE (ML) INTRAVENOUS
Refills: 0 | Status: DISCONTINUED | OUTPATIENT
Start: 2024-07-11 | End: 2024-07-11

## 2024-07-11 RX ORDER — SODIUM CHLORIDE 9 G/1000ML
1000 INJECTION, SOLUTION INTRAVENOUS
Refills: 0 | Status: DISCONTINUED | OUTPATIENT
Start: 2024-07-11 | End: 2024-07-12

## 2024-07-11 RX ORDER — LIDOCAINE HCL/PF 10 MG/ML
0.2 VIAL (ML) INJECTION ONCE
Refills: 0 | Status: DISCONTINUED | OUTPATIENT
Start: 2024-07-11 | End: 2024-07-11

## 2024-07-11 RX ORDER — ONDANSETRON HCL/PF 4 MG/2 ML
4 VIAL (ML) INJECTION ONCE
Refills: 0 | Status: DISCONTINUED | OUTPATIENT
Start: 2024-07-11 | End: 2024-07-11

## 2024-07-11 RX ORDER — DICLOFENAC SODIUM 75 MG/1
75 TABLET, DELAYED RELEASE ORAL DAILY
Refills: 0 | Status: DISCONTINUED | OUTPATIENT
Start: 2024-07-12 | End: 2024-07-13

## 2024-07-11 RX ORDER — GABAPENTIN
1000 POWDER (GRAM) MISCELLANEOUS
Refills: 0 | DISCHARGE

## 2024-07-11 RX ORDER — SUVOREXANT 10 MG/1
1 TABLET, FILM COATED ORAL
Refills: 0 | DISCHARGE

## 2024-07-11 RX ORDER — NALBUPHINE HYDROCHLORIDE 10 MG/ML
2.5 INJECTION INTRAMUSCULAR; INTRAVENOUS; SUBCUTANEOUS EVERY 6 HOURS
Refills: 0 | Status: DISCONTINUED | OUTPATIENT
Start: 2024-07-11 | End: 2024-07-13

## 2024-07-11 RX ORDER — CETIRIZINE HCL 10 MG
1 TABLET,CHEWABLE ORAL
Refills: 0 | DISCHARGE

## 2024-07-11 RX ORDER — BUDESONIDE AND FORMOTEROL FUMARATE DIHYDRATE 80; 4.5 UG/1; UG/1
2 AEROSOL RESPIRATORY (INHALATION)
Refills: 0 | Status: DISCONTINUED | OUTPATIENT
Start: 2024-07-11 | End: 2024-07-13

## 2024-07-11 RX ORDER — ISONIAZID 300 MG/1
300 TABLET ORAL DAILY
Refills: 0 | Status: DISCONTINUED | OUTPATIENT
Start: 2024-07-11 | End: 2024-07-13

## 2024-07-11 RX ORDER — MONTELUKAST SODIUM 10 MG/1
10 TABLET ORAL DAILY
Refills: 0 | Status: DISCONTINUED | OUTPATIENT
Start: 2024-07-12 | End: 2024-07-13

## 2024-07-11 RX ORDER — ACETAMINOPHEN 500 MG/5ML
1000 LIQUID (ML) ORAL EVERY 6 HOURS
Refills: 0 | Status: DISCONTINUED | OUTPATIENT
Start: 2024-07-11 | End: 2024-07-12

## 2024-07-11 RX ORDER — MOMETASONE FUROATE 50 UG/1
2 SPRAY NASAL
Refills: 0 | DISCHARGE

## 2024-07-11 RX ORDER — HEPARIN SODIUM 1000 [USP'U]/ML
5000 INJECTION INTRAVENOUS; SUBCUTANEOUS EVERY 8 HOURS
Refills: 0 | Status: DISCONTINUED | OUTPATIENT
Start: 2024-07-11 | End: 2024-07-13

## 2024-07-11 RX ORDER — DICLOFENAC SODIUM 75 MG/1
1 TABLET, DELAYED RELEASE ORAL
Refills: 0 | DISCHARGE

## 2024-07-11 RX ORDER — HYDROMORPHONE/SOD CHLOR,ISO/PF 2 MG/10 ML
0.5 SYRINGE (ML) INJECTION
Refills: 0 | Status: DISCONTINUED | OUTPATIENT
Start: 2024-07-11 | End: 2024-07-11

## 2024-07-11 RX ADMIN — NALBUPHINE HYDROCHLORIDE 2.5 MILLIGRAM(S): 10 INJECTION INTRAMUSCULAR; INTRAVENOUS; SUBCUTANEOUS at 23:56

## 2024-07-11 RX ADMIN — NALBUPHINE HYDROCHLORIDE 2.5 MILLIGRAM(S): 10 INJECTION INTRAMUSCULAR; INTRAVENOUS; SUBCUTANEOUS at 12:49

## 2024-07-11 RX ADMIN — HEPARIN SODIUM 5000 UNIT(S): 1000 INJECTION INTRAVENOUS; SUBCUTANEOUS at 23:18

## 2024-07-11 RX ADMIN — NALBUPHINE HYDROCHLORIDE 2.5 MILLIGRAM(S): 10 INJECTION INTRAMUSCULAR; INTRAVENOUS; SUBCUTANEOUS at 13:05

## 2024-07-11 RX ADMIN — SODIUM CHLORIDE 40 MILLILITER(S): 9 INJECTION, SOLUTION INTRAVENOUS at 17:09

## 2024-07-11 RX ADMIN — Medication 400 MILLIGRAM(S): at 17:09

## 2024-07-11 RX ADMIN — Medication 1000 MILLIGRAM(S): at 21:59

## 2024-07-11 RX ADMIN — HEPARIN SODIUM 5000 UNIT(S): 1000 INJECTION INTRAVENOUS; SUBCUTANEOUS at 17:09

## 2024-07-11 RX ADMIN — SODIUM CHLORIDE 40 MILLILITER(S): 9 INJECTION, SOLUTION INTRAVENOUS at 11:20

## 2024-07-11 RX ADMIN — Medication 1000 MILLIGRAM(S): at 17:39

## 2024-07-11 RX ADMIN — Medication 400 MILLIGRAM(S): at 21:29

## 2024-07-11 RX ADMIN — Medication 50 MILLIGRAM(S): at 21:29

## 2024-07-12 ENCOUNTER — TRANSCRIPTION ENCOUNTER (OUTPATIENT)
Age: 59
End: 2024-07-12

## 2024-07-12 LAB
ANION GAP SERPL CALC-SCNC: 11 MMOL/L — SIGNIFICANT CHANGE UP (ref 5–17)
BUN SERPL-MCNC: 11 MG/DL — SIGNIFICANT CHANGE UP (ref 7–23)
CALCIUM SERPL-MCNC: 9.1 MG/DL — SIGNIFICANT CHANGE UP (ref 8.4–10.5)
CHLORIDE SERPL-SCNC: 102 MMOL/L — SIGNIFICANT CHANGE UP (ref 96–108)
CO2 SERPL-SCNC: 25 MMOL/L — SIGNIFICANT CHANGE UP (ref 22–31)
CREAT SERPL-MCNC: 1.18 MG/DL — SIGNIFICANT CHANGE UP (ref 0.5–1.3)
EGFR: 71 ML/MIN/1.73M2 — SIGNIFICANT CHANGE UP
EGFR: 71 ML/MIN/1.73M2 — SIGNIFICANT CHANGE UP
GLUCOSE SERPL-MCNC: 85 MG/DL — SIGNIFICANT CHANGE UP (ref 70–99)
HCT VFR BLD CALC: 38 % — LOW (ref 39–50)
HGB BLD-MCNC: 12.4 G/DL — LOW (ref 13–17)
MAGNESIUM SERPL-MCNC: 2.2 MG/DL — SIGNIFICANT CHANGE UP (ref 1.6–2.6)
MCHC RBC-ENTMCNC: 29.6 PG — SIGNIFICANT CHANGE UP (ref 27–34)
MCHC RBC-ENTMCNC: 32.6 GM/DL — SIGNIFICANT CHANGE UP (ref 32–36)
MCV RBC AUTO: 90.7 FL — SIGNIFICANT CHANGE UP (ref 80–100)
NRBC # BLD: 0 /100 WBCS — SIGNIFICANT CHANGE UP (ref 0–0)
NRBC BLD-RTO: 0 /100 WBCS — SIGNIFICANT CHANGE UP (ref 0–0)
PHOSPHATE SERPL-MCNC: 2.2 MG/DL — LOW (ref 2.5–4.5)
PLATELET # BLD AUTO: 164 K/UL — SIGNIFICANT CHANGE UP (ref 150–400)
POTASSIUM SERPL-MCNC: 4 MMOL/L — SIGNIFICANT CHANGE UP (ref 3.5–5.3)
POTASSIUM SERPL-SCNC: 4 MMOL/L — SIGNIFICANT CHANGE UP (ref 3.5–5.3)
RBC # BLD: 4.19 M/UL — LOW (ref 4.2–5.8)
RBC # FLD: 15.1 % — HIGH (ref 10.3–14.5)
SODIUM SERPL-SCNC: 138 MMOL/L — SIGNIFICANT CHANGE UP (ref 135–145)
WBC # BLD: 6.88 K/UL — SIGNIFICANT CHANGE UP (ref 3.8–10.5)
WBC # FLD AUTO: 6.88 K/UL — SIGNIFICANT CHANGE UP (ref 3.8–10.5)

## 2024-07-12 RX ORDER — ACETAMINOPHEN 500 MG/5ML
1000 LIQUID (ML) ORAL EVERY 6 HOURS
Refills: 0 | Status: DISCONTINUED | OUTPATIENT
Start: 2024-07-12 | End: 2024-07-13

## 2024-07-12 RX ORDER — GABAPENTIN 400 MG/1
1000 CAPSULE ORAL ONCE
Refills: 0 | Status: COMPLETED | OUTPATIENT
Start: 2024-07-12 | End: 2024-07-12

## 2024-07-12 RX ORDER — POTASSIUM CHLORIDE, DEXTROSE MONOHYDRATE AND SODIUM CHLORIDE 150; 5; 900 MG/100ML; G/100ML; MG/100ML
1000 INJECTION, SOLUTION INTRAVENOUS
Refills: 0 | Status: DISCONTINUED | OUTPATIENT
Start: 2024-07-12 | End: 2024-07-12

## 2024-07-12 RX ORDER — MAGNESIUM OXIDE 400 MG
1000 TABLET ORAL EVERY 12 HOURS
Refills: 0 | Status: DISCONTINUED | OUTPATIENT
Start: 2024-07-12 | End: 2024-07-13

## 2024-07-12 RX ORDER — SODIUM PHOSPHATE,DIBASIC DIHYD
30 POWDER (GRAM) MISCELLANEOUS ONCE
Refills: 0 | Status: COMPLETED | OUTPATIENT
Start: 2024-07-12 | End: 2024-07-12

## 2024-07-12 RX ADMIN — Medication 1000 MILLIGRAM(S): at 23:50

## 2024-07-12 RX ADMIN — Medication 1000 MILLIGRAM(S): at 16:57

## 2024-07-12 RX ADMIN — Medication 400 MILLIGRAM(S): at 05:01

## 2024-07-12 RX ADMIN — HEPARIN SODIUM 5000 UNIT(S): 1000 INJECTION INTRAVENOUS; SUBCUTANEOUS at 16:57

## 2024-07-12 RX ADMIN — Medication 40 MILLIGRAM(S): at 05:01

## 2024-07-12 RX ADMIN — Medication 50 MILLIGRAM(S): at 22:01

## 2024-07-12 RX ADMIN — MONTELUKAST SODIUM 10 MILLIGRAM(S): 10 TABLET ORAL at 22:01

## 2024-07-12 RX ADMIN — Medication 85 MILLIMOLE(S): at 12:54

## 2024-07-12 RX ADMIN — Medication 50 MILLIGRAM(S): at 11:49

## 2024-07-12 RX ADMIN — Medication 1000 MILLIGRAM(S): at 11:49

## 2024-07-12 RX ADMIN — HEPARIN SODIUM 5000 UNIT(S): 1000 INJECTION INTRAVENOUS; SUBCUTANEOUS at 23:50

## 2024-07-12 RX ADMIN — HEPARIN SODIUM 5000 UNIT(S): 1000 INJECTION INTRAVENOUS; SUBCUTANEOUS at 09:19

## 2024-07-12 RX ADMIN — ISONIAZID 300 MILLIGRAM(S): 300 TABLET ORAL at 11:48

## 2024-07-12 RX ADMIN — Medication 400 MILLIGRAM(S): at 22:01

## 2024-07-12 RX ADMIN — Medication 1000 MILLIGRAM(S): at 09:19

## 2024-07-12 RX ADMIN — GABAPENTIN 1000 MILLIGRAM(S): 400 CAPSULE ORAL at 23:49

## 2024-07-12 RX ADMIN — Medication 1000 MILLIGRAM(S): at 05:31

## 2024-07-13 ENCOUNTER — TRANSCRIPTION ENCOUNTER (OUTPATIENT)
Age: 59
End: 2024-07-13

## 2024-07-13 VITALS
DIASTOLIC BLOOD PRESSURE: 75 MMHG | HEART RATE: 72 BPM | SYSTOLIC BLOOD PRESSURE: 126 MMHG | OXYGEN SATURATION: 95 % | TEMPERATURE: 99 F | RESPIRATION RATE: 16 BRPM

## 2024-07-13 LAB
ANION GAP SERPL CALC-SCNC: 11 MMOL/L — SIGNIFICANT CHANGE UP (ref 5–17)
BILIRUB SERPL-MCNC: 0.4 MG/DL — SIGNIFICANT CHANGE UP (ref 0.2–1.2)
BUN SERPL-MCNC: 12 MG/DL — SIGNIFICANT CHANGE UP (ref 7–23)
CALCIUM SERPL-MCNC: 9.2 MG/DL — SIGNIFICANT CHANGE UP (ref 8.4–10.5)
CHLORIDE SERPL-SCNC: 102 MMOL/L — SIGNIFICANT CHANGE UP (ref 96–108)
CO2 SERPL-SCNC: 25 MMOL/L — SIGNIFICANT CHANGE UP (ref 22–31)
CREAT SERPL-MCNC: 1 MG/DL — SIGNIFICANT CHANGE UP (ref 0.5–1.3)
EGFR: 87 ML/MIN/1.73M2 — SIGNIFICANT CHANGE UP
EGFR: 87 ML/MIN/1.73M2 — SIGNIFICANT CHANGE UP
GLUCOSE SERPL-MCNC: 91 MG/DL — SIGNIFICANT CHANGE UP (ref 70–99)
HCT VFR BLD CALC: 35.6 % — LOW (ref 39–50)
HGB BLD-MCNC: 11.6 G/DL — LOW (ref 13–17)
MAGNESIUM SERPL-MCNC: 2.2 MG/DL — SIGNIFICANT CHANGE UP (ref 1.6–2.6)
MCHC RBC-ENTMCNC: 29.4 PG — SIGNIFICANT CHANGE UP (ref 27–34)
MCHC RBC-ENTMCNC: 32.6 GM/DL — SIGNIFICANT CHANGE UP (ref 32–36)
MCV RBC AUTO: 90.1 FL — SIGNIFICANT CHANGE UP (ref 80–100)
NRBC # BLD: 0 /100 WBCS — SIGNIFICANT CHANGE UP (ref 0–0)
NRBC BLD-RTO: 0 /100 WBCS — SIGNIFICANT CHANGE UP (ref 0–0)
PHOSPHATE SERPL-MCNC: 2.1 MG/DL — LOW (ref 2.5–4.5)
PLATELET # BLD AUTO: 163 K/UL — SIGNIFICANT CHANGE UP (ref 150–400)
POTASSIUM SERPL-MCNC: 3.4 MMOL/L — LOW (ref 3.5–5.3)
POTASSIUM SERPL-SCNC: 3.4 MMOL/L — LOW (ref 3.5–5.3)
RBC # BLD: 3.95 M/UL — LOW (ref 4.2–5.8)
RBC # FLD: 14.9 % — HIGH (ref 10.3–14.5)
SODIUM SERPL-SCNC: 138 MMOL/L — SIGNIFICANT CHANGE UP (ref 135–145)
WBC # BLD: 7 K/UL — SIGNIFICANT CHANGE UP (ref 3.8–10.5)
WBC # FLD AUTO: 7 K/UL — SIGNIFICANT CHANGE UP (ref 3.8–10.5)

## 2024-07-13 PROCEDURE — C1889: CPT

## 2024-07-13 PROCEDURE — C9399: CPT

## 2024-07-13 PROCEDURE — 82247 BILIRUBIN TOTAL: CPT

## 2024-07-13 PROCEDURE — 36415 COLL VENOUS BLD VENIPUNCTURE: CPT

## 2024-07-13 PROCEDURE — 85027 COMPLETE CBC AUTOMATED: CPT

## 2024-07-13 PROCEDURE — 83735 ASSAY OF MAGNESIUM: CPT

## 2024-07-13 PROCEDURE — 94640 AIRWAY INHALATION TREATMENT: CPT

## 2024-07-13 PROCEDURE — 97161 PT EVAL LOW COMPLEX 20 MIN: CPT

## 2024-07-13 PROCEDURE — 88304 TISSUE EXAM BY PATHOLOGIST: CPT

## 2024-07-13 PROCEDURE — 80048 BASIC METABOLIC PNL TOTAL CA: CPT

## 2024-07-13 PROCEDURE — 84100 ASSAY OF PHOSPHORUS: CPT

## 2024-07-13 RX ORDER — TRAZODONE HCL 100 MG
1 TABLET ORAL
Qty: 0 | Refills: 0 | DISCHARGE
Start: 2024-07-13

## 2024-07-13 RX ORDER — RIFAMPIN 150 MG/1
1 CAPSULE ORAL
Qty: 0 | Refills: 0 | DISCHARGE
Start: 2024-07-13

## 2024-07-13 RX ORDER — PYRIDOXINE HCL (VITAMIN B6) 25 MG
1 TABLET ORAL
Qty: 0 | Refills: 0 | DISCHARGE
Start: 2024-07-13

## 2024-07-13 RX ORDER — OXYCODONE HYDROCHLORIDE 30 MG/1
1 TABLET ORAL
Qty: 3 | Refills: 0
Start: 2024-07-13 | End: 2024-07-13

## 2024-07-13 RX ORDER — MONTELUKAST SODIUM 10 MG/1
1 TABLET ORAL
Qty: 0 | Refills: 0 | DISCHARGE
Start: 2024-07-13

## 2024-07-13 RX ORDER — SOD PHOS DI, MONO/K PHOS MONO 250 MG
1 TABLET ORAL ONCE
Refills: 0 | Status: COMPLETED | OUTPATIENT
Start: 2024-07-13 | End: 2024-07-13

## 2024-07-13 RX ADMIN — Medication 1 PACKET(S): at 14:17

## 2024-07-13 RX ADMIN — Medication 1000 MILLIGRAM(S): at 12:03

## 2024-07-13 RX ADMIN — HEPARIN SODIUM 5000 UNIT(S): 1000 INJECTION INTRAVENOUS; SUBCUTANEOUS at 08:30

## 2024-07-13 RX ADMIN — BUDESONIDE AND FORMOTEROL FUMARATE DIHYDRATE 2 PUFF(S): 80; 4.5 AEROSOL RESPIRATORY (INHALATION) at 05:30

## 2024-07-13 RX ADMIN — Medication 40 MILLIGRAM(S): at 05:35

## 2024-07-13 RX ADMIN — Medication 20 MILLIEQUIVALENT(S): at 14:17

## 2024-07-13 RX ADMIN — Medication 1000 MILLIGRAM(S): at 11:03

## 2024-07-13 RX ADMIN — Medication 1000 MILLIGRAM(S): at 05:30

## 2024-07-13 RX ADMIN — Medication 1000 MILLIGRAM(S): at 00:20

## 2024-07-13 RX ADMIN — Medication 20 MILLIEQUIVALENT(S): at 13:44

## 2024-07-13 RX ADMIN — Medication 400 MILLIGRAM(S): at 05:35

## 2024-07-13 RX ADMIN — Medication 1000 MILLIGRAM(S): at 06:00

## 2024-07-14 LAB
MELD SCORE WITH DIALYSIS: SIGNIFICANT CHANGE UP
MELD SCORE WITHOUT DIALYSIS: SIGNIFICANT CHANGE UP POINTS

## 2024-07-16 ENCOUNTER — NON-APPOINTMENT (OUTPATIENT)
Age: 59
End: 2024-07-16

## 2024-07-21 LAB — SURGICAL PATHOLOGY STUDY: SIGNIFICANT CHANGE UP

## 2024-07-24 ENCOUNTER — APPOINTMENT (OUTPATIENT)
Dept: COLORECTAL SURGERY | Facility: CLINIC | Age: 59
End: 2024-07-24
Payer: COMMERCIAL

## 2024-07-24 PROCEDURE — 99024 POSTOP FOLLOW-UP VISIT: CPT

## 2024-07-24 NOTE — HISTORY OF PRESENT ILLNESS
[FreeTextEntry1] : 58M who presents for his first post-operative follow up appointment.   He is approximately two weeks status post ileostomy closure. He had an ileostomy created to protect his colorectal anastomosis after an elective resection for recurrent diverticulitis. He has been doing well at home. He occasionally feels a pulling sensation at his ileostomy closure site when he moves. He has been tolerating his low fiber diet and has been having up to 10-15 clustered bowel movements a day.  Physical examination reveals that his prior trocar sites and specimen extraction site are completely healed. His right side ileostomy closure site is healing appropriately with healthy granulation tissue.    Patient is cleared for regular high fiber diet. He was also instructed to take daily Metamucil He should continue to abstain from strenuous physical activity for another 4 weeks.  He will follow up in 1 month for wound check

## 2024-08-21 ENCOUNTER — APPOINTMENT (OUTPATIENT)
Dept: COLORECTAL SURGERY | Facility: CLINIC | Age: 59
End: 2024-08-21
Payer: COMMERCIAL

## 2024-08-21 PROCEDURE — 99024 POSTOP FOLLOW-UP VISIT: CPT

## 2024-08-23 NOTE — HISTORY OF PRESENT ILLNESS
[FreeTextEntry1] : Pleasant 58-year-old gentleman who presents for a postoperative follow-up visit.  Patient is approximately 6 weeks status post closure of diverting loop ileostomy.  The ileostomy was constructed to protect an at risk colorectal anastomosis done after resection for recurrent diverticulitis.  Currently the patient looks and feels excellently.  He has no incisional pain.  Is tolerating diet and moving his bowels.  He denies rectal bleeding or temperature elevation.  Physical examination reveals a protuberant abdomen.  His ileostomy closure site in the right lower quadrant is 99% healed.  There is some puckering of the skin simply due to scar reaction.  The patient was reassured that he has made an excellent recuperation.  He has no further dietary or physical activity restrictions.  He does have a gastroenterologist who performed his colonoscopies.  I will see him on an as-needed basis.

## 2025-02-24 ENCOUNTER — LABORATORY RESULT (OUTPATIENT)
Age: 60
End: 2025-02-24

## 2025-02-24 ENCOUNTER — APPOINTMENT (OUTPATIENT)
Dept: UROLOGY | Facility: CLINIC | Age: 60
End: 2025-02-24
Payer: COMMERCIAL

## 2025-02-24 VITALS
SYSTOLIC BLOOD PRESSURE: 154 MMHG | WEIGHT: 227 LBS | OXYGEN SATURATION: 96 % | DIASTOLIC BLOOD PRESSURE: 77 MMHG | TEMPERATURE: 98 F | HEIGHT: 70 IN | HEART RATE: 88 BPM | BODY MASS INDEX: 32.5 KG/M2

## 2025-02-24 DIAGNOSIS — R97.20 ELEVATED PROSTATE, SPECIFIC ANTIGEN [PSA]: ICD-10-CM

## 2025-02-24 DIAGNOSIS — C67.9 MALIGNANT NEOPLASM OF BLADDER, UNSPECIFIED: ICD-10-CM

## 2025-02-24 PROCEDURE — 99214 OFFICE O/P EST MOD 30 MIN: CPT

## 2025-02-24 PROCEDURE — 99204 OFFICE O/P NEW MOD 45 MIN: CPT

## 2025-02-25 LAB
ANION GAP SERPL CALC-SCNC: 12 MMOL/L
APPEARANCE: CLEAR
BILIRUBIN URINE: NEGATIVE
BLOOD URINE: ABNORMAL
BUN SERPL-MCNC: 19 MG/DL
CALCIUM SERPL-MCNC: 9.7 MG/DL
CHLORIDE SERPL-SCNC: 106 MMOL/L
CO2 SERPL-SCNC: 23 MMOL/L
COLOR: ABNORMAL
CREAT SERPL-MCNC: 1.36 MG/DL
EGFR: 60 ML/MIN/1.73M2
GLUCOSE QUALITATIVE U: NEGATIVE MG/DL
GLUCOSE SERPL-MCNC: 90 MG/DL
HCT VFR BLD CALC: 39.9 %
HGB BLD-MCNC: 12.5 G/DL
KETONES URINE: NEGATIVE MG/DL
LEUKOCYTE ESTERASE URINE: ABNORMAL
MCHC RBC-ENTMCNC: 25.9 PG
MCHC RBC-ENTMCNC: 31.3 G/DL
MCV RBC AUTO: 82.8 FL
NITRITE URINE: POSITIVE
PH URINE: 6
PLATELET # BLD AUTO: 208 K/UL
POTASSIUM SERPL-SCNC: 4.5 MMOL/L
PROTEIN URINE: NEGATIVE MG/DL
PSA FREE FLD-MCNC: 24 %
PSA FREE SERPL-MCNC: 0.26 NG/ML
PSA SERPL-MCNC: 1.1 NG/ML
RBC # BLD: 4.82 M/UL
RBC # FLD: 16.4 %
SODIUM SERPL-SCNC: 141 MMOL/L
SPECIFIC GRAVITY URINE: 1.02
UROBILINOGEN URINE: 1 MG/DL
WBC # FLD AUTO: 5.89 K/UL

## 2025-02-26 LAB — BACTERIA UR CULT: NORMAL

## 2025-03-13 ENCOUNTER — OUTPATIENT (OUTPATIENT)
Dept: OUTPATIENT SERVICES | Facility: HOSPITAL | Age: 60
LOS: 1 days | End: 2025-03-13
Payer: COMMERCIAL

## 2025-03-13 ENCOUNTER — RESULT REVIEW (OUTPATIENT)
Age: 60
End: 2025-03-13

## 2025-03-13 ENCOUNTER — APPOINTMENT (OUTPATIENT)
Dept: MRI IMAGING | Facility: CLINIC | Age: 60
End: 2025-03-13
Payer: COMMERCIAL

## 2025-03-13 DIAGNOSIS — Z98.890 OTHER SPECIFIED POSTPROCEDURAL STATES: Chronic | ICD-10-CM

## 2025-03-13 DIAGNOSIS — Z90.89 ACQUIRED ABSENCE OF OTHER ORGANS: Chronic | ICD-10-CM

## 2025-03-13 DIAGNOSIS — R97.20 ELEVATED PROSTATE SPECIFIC ANTIGEN [PSA]: ICD-10-CM

## 2025-03-13 PROCEDURE — 76498P: CUSTOM | Mod: 26

## 2025-03-13 PROCEDURE — 72197 MRI PELVIS W/O & W/DYE: CPT

## 2025-03-13 PROCEDURE — 76498 UNLISTED MR PROCEDURE: CPT

## 2025-03-13 PROCEDURE — 72197 MRI PELVIS W/O & W/DYE: CPT | Mod: 26

## 2025-03-13 PROCEDURE — A9585: CPT

## 2025-03-31 ENCOUNTER — NON-APPOINTMENT (OUTPATIENT)
Age: 60
End: 2025-03-31

## 2025-04-01 ENCOUNTER — OUTPATIENT (OUTPATIENT)
Dept: OUTPATIENT SERVICES | Facility: HOSPITAL | Age: 60
LOS: 1 days | End: 2025-04-01
Payer: COMMERCIAL

## 2025-04-01 DIAGNOSIS — Z98.890 OTHER SPECIFIED POSTPROCEDURAL STATES: Chronic | ICD-10-CM

## 2025-04-01 DIAGNOSIS — R97.20 ELEVATED PROSTATE SPECIFIC ANTIGEN [PSA]: ICD-10-CM

## 2025-04-01 DIAGNOSIS — Z90.89 ACQUIRED ABSENCE OF OTHER ORGANS: Chronic | ICD-10-CM

## 2025-04-01 PROCEDURE — C8001: CPT

## 2025-04-02 RX ORDER — ONDANSETRON HCL/PF 4 MG/2 ML
4 VIAL (ML) INJECTION ONCE
Refills: 0 | Status: DISCONTINUED | OUTPATIENT
Start: 2025-04-03 | End: 2025-04-03

## 2025-04-02 RX ORDER — HYDROMORPHONE/SOD CHLOR,ISO/PF 2 MG/10 ML
0.5 SYRINGE (ML) INJECTION ONCE
Refills: 0 | Status: DISCONTINUED | OUTPATIENT
Start: 2025-04-03 | End: 2025-04-03

## 2025-04-02 RX ORDER — OXYCODONE HYDROCHLORIDE 30 MG/1
5 TABLET ORAL ONCE
Refills: 0 | Status: DISCONTINUED | OUTPATIENT
Start: 2025-04-03 | End: 2025-04-03

## 2025-04-02 RX ORDER — SODIUM CHLORIDE 9 G/1000ML
1000 INJECTION, SOLUTION INTRAVENOUS
Refills: 0 | Status: DISCONTINUED | OUTPATIENT
Start: 2025-04-03 | End: 2025-04-03

## 2025-04-02 NOTE — ASU PATIENT PROFILE, ADULT - BLOOD TRANSFUSION, PREVIOUS, PROFILE
Visit Information Date & Time Provider Department Dept. Phone Encounter #  
 4/27/2017  1:20 PM Ramesh Mistry MD [facility] [phone] [id number] Your Appointments 5/17/2017  2:30 PM  
PHYSICAL with Sherie Canela MD  
[facility] Internal Medicine [facility]) Appt Note: wellness [address] [facility] [address]  
[address] [facility]  
  
    
 11/13/2017  2:40 PM  
[place]  
[id number] [facility] ([facility]) Appt Note: Yearly F/U, bring machine. [address] [facility] [address]  
  
   
 [address] [facility] [address] [place], [place] [id number] Upcoming Health Maintenance Date Due FOBT Q 1 YEAR, 18+ 6/8/1961 DTaP/Tdap/Td series (1 - Tdap) 6/8/1964 ZOSTER VACCINE AGE 60> 6/8/2003 INFLUENZA AGE 9 TO ADULT 8/1/2016 MEDICARE YEARLY EXAM 11/6/2016 BREAST CANCER SCRN MAMMOGRAM 11/17/2016 Pneumococcal 65+ Low/Medium Risk (2 of 2 - PPSV23) 1/27/2017 HEMOGLOBIN A1C Q6M 5/9/2017 LIPID PANEL Q1 5/27/2017 EYE EXAM RETINAL OR DILATED Q1 6/23/2017 FOOT EXAM Q1 11/9/2017 MICROALBUMIN Q1 11/9/2017 GLAUCOMA SCREENING Q2Y 6/23/2018 COLONOSCOPY 11/6/2025 Allergies as of 4/27/2017  Review Complete On: 4/27/2017 By: Wanda Pena LPN Severity Noted Reaction Type Reactions Cetirizine Medium 10/18/2016    Hives Other reaction(s): Hives Amlodipine  01/03/2014   Side Effect Swelling  
 lower extremity swelling Coreg [Carvedilol]  04/29/2014   Side Effect Other (comments)  
 sweating Erythromycin  09/29/2010    Hives Verapamil Low 01/31/2012   Not Verified Other (comments) Patient unable to take verapamil for rate control secondary to peripheral edema/maliase/feeling of being high. Current Immunizations  Reviewed on 11/18/2016 Name Date Influenza High Dose Vaccine PF 11/6/2015 Influenza Vaccine Split 1/28/2012 12:03 AM, 3/31/2011  2:45 AM  
 Pneumococcal Vaccine (Unspecified Type) 1/27/2012 11:56 PM  
  
 Not reviewed this visit Vitals BP Height(growth percentile) Weight(growth percentile) BMI OB Status Smoking Status 140/90 5' 7\" (1.702 m) 184 lb (83.5 kg) 28.82 kg/m2 Hysterectomy Former Smoker BMI and BSA Data Body Mass Index Body Surface Area  
 28.82 kg/m 2 1.99 m 2 Preferred Pharmacy Pharmacy Name Phone [id number] [phone] Your Updated Medication List  
  
   
This list is accurate as of: 4/27/17  1:59 PM.  Always use your most recent med list.  
  
  
  
  
 Boles Rideau 250-50 mcg/dose diskus inhaler Generic drug:  fluticasone-salmeterol INHALE 1 PUFF TWICE A DAY  
  
 albuterol 90 mcg/actuation inhaler Commonly known as:  PROVENTIL HFA, VENTOLIN HFA, PROAIR HFA Take 1 Puff by inhalation every six (6) hours as needed for Wheezing. albuterol-ipratropium 2.5 mg-0.5 mg/3 ml Nebu Commonly known as:  DUO-NEB  
3 mL by Nebulization route four (4) times daily. aspirin 81 mg tablet Take 1 Tab by mouth daily. atorvastatin 10 mg tablet Commonly known as:  LIPITOR  
TAKE 1 TAB BY MOUTH DAILY. carvedilol 6.25 mg tablet Commonly known as:  Jeanella Meline Take 1 Tab by mouth two (2) times daily (with meals). cpap machine kit  
by Does Not Apply route nightly. digoxin 0.125 mg tablet Commonly known as:  LANOXIN  
TAKE 1 TABLET BY MOUTH EVERY DAY  
  
 ELIQUIS 5 mg tablet Generic drug:  apixaban TAKE 1 TAB BY MOUTH TWO (2) TIMES A DAY. furosemide 20 mg tablet Commonly known as:  LASIX  
1 tablet daily  
  
 multivitamin tablet Commonly known as:  ONE A DAY Take 1 Tab by mouth daily. tiotropium 18 mcg inhalation capsule Commonly known as:  Roni Brady Take 1 Cap by inhalation daily. triamcinolone acetonide 0.1 % topical cream  
Commonly known as:  KENALOG  
APPLY A THIN LAYER TO AFFECTED AREA(S) AS DIRECTED TWICE A DAY  
  
 valsartan-hydroCHLOROthiazide 160-25 mg per tablet Commonly known as:  DIOVAN-HCT  
TAKE 1 TABLET BY MOUTH EVERY DAY To-Do List   
 05/19/2017 10:00 AM  
  Appointment with [facility] 4 at [address] [place] [address] ([phone]) Shower or bathe using soap and water. Do not use deodorant, powder, perfumes, or lotion the day of your exam.  If your prior mammograms were not performed at [facility] 6 please bring films with you or forward prior images 2 days before your procedure. Check in at registration 15min before your appointment time unless you were instructed to do otherwise. A script is not necessary, but if you have one, please bring it on the day of the mammogram or have it faxed to the department. [facility] [phone] [facility]  [phone] [address]  [phone] [facility] [phone] [address] [phone] Patient Instructions Follow up with Dr. Luna Sun in 6 months Introducing [facility]! King Eduardo introduces [employer] patient portal. Now you can access parts of your medical record, email your doctor's office, and request medication refills online. 1. In your internet browser, go to [email or web address] 2. Click on the First Time User? Click Here link in the Sign In box. You will see the New Member Sign Up page. 3. Enter your [employer] Access Code exactly as it appears below. You will not need to use this code after youve completed the sign-up process. If you do not sign up before the expiration date, you must request a new code. · [employer] Access Code: [email or web address] Expires: 6/21/2017  3:37 PM 
 
4.  Enter the last four digits of your Social Security Number (xxxx) and Date of Birth (mm/dd/yyyy) as indicated and click Submit. You will be taken to the next sign-up page. 5. Create a [employer] ID. This will be your [employer] login ID and cannot be changed, so think of one that is secure and easy to remember. 6. Create a [employer] password. You can change your password at any time. 7. Enter your Password Reset Question and Answer. This can be used at a later time if you forget your password. 8. Enter your e-mail address. You will receive e-mail notification when new information is available in [address] 9. Click Sign Up. You can now view and download portions of your medical record. 10. Click the Download Summary menu link to download a portable copy of your medical information. If you have questions, please visit the Frequently Asked Questions section of the [employer] website. Remember, [employer] is NOT to be used for urgent needs. For medical emergencies, dial 911. Now available from your iPhone and Android! Please provide this summary of care documentation to your next provider. Your primary care clinician is listed as Adrian Santana. If you have any questions after today's visit, please call [phone]. no

## 2025-04-02 NOTE — ASU PATIENT PROFILE, ADULT - FALL HARM RISK - UNIVERSAL INTERVENTIONS
Bed in lowest position, wheels locked, appropriate side rails in place/Call bell, personal items and telephone in reach/Instruct patient to call for assistance before getting out of bed or chair/Non-slip footwear when patient is out of bed/[place] to call system/Physically safe environment - no spills, clutter or unnecessary equipment/Purposeful Proactive Rounding/Room/bathroom lighting operational, light cord in reach

## 2025-04-02 NOTE — ASU PATIENT PROFILE, ADULT - NSICDXPASTSURGICALHX_GEN_ALL_CORE_FT
PAST SURGICAL HISTORY:  H/O colonoscopy     H/O transurethral resection of bladder tumor (TURBT)     H/O ureteroscopy     History of colon surgery     S/P tonsillectomy

## 2025-04-02 NOTE — ASU PATIENT PROFILE, ADULT - NS PREOP UNDERSTANDS INFO
Nothing to eat or drink after 12 mid-night 4/2/2025/ water no later than 5:30am DOS/ photo ID and insurance card/ comfortable clothing/ friend will take him home/yes

## 2025-04-03 ENCOUNTER — APPOINTMENT (OUTPATIENT)
Dept: UROLOGY | Facility: AMBULATORY SURGERY CENTER | Age: 60
End: 2025-04-03

## 2025-04-03 ENCOUNTER — OUTPATIENT (OUTPATIENT)
Dept: OUTPATIENT SERVICES | Facility: HOSPITAL | Age: 60
LOS: 1 days | Discharge: HOME | End: 2025-04-03
Payer: COMMERCIAL

## 2025-04-03 ENCOUNTER — TRANSCRIPTION ENCOUNTER (OUTPATIENT)
Age: 60
End: 2025-04-03

## 2025-04-03 ENCOUNTER — RESULT REVIEW (OUTPATIENT)
Age: 60
End: 2025-04-03

## 2025-04-03 VITALS
DIASTOLIC BLOOD PRESSURE: 64 MMHG | WEIGHT: 233.25 LBS | SYSTOLIC BLOOD PRESSURE: 139 MMHG | OXYGEN SATURATION: 97 % | HEART RATE: 64 BPM | TEMPERATURE: 98 F | RESPIRATION RATE: 14 BRPM | HEIGHT: 70 IN

## 2025-04-03 VITALS
HEART RATE: 78 BPM | DIASTOLIC BLOOD PRESSURE: 67 MMHG | SYSTOLIC BLOOD PRESSURE: 123 MMHG | RESPIRATION RATE: 15 BRPM | OXYGEN SATURATION: 99 %

## 2025-04-03 DIAGNOSIS — Z90.89 ACQUIRED ABSENCE OF OTHER ORGANS: Chronic | ICD-10-CM

## 2025-04-03 DIAGNOSIS — Z98.890 OTHER SPECIFIED POSTPROCEDURAL STATES: Chronic | ICD-10-CM

## 2025-04-03 PROCEDURE — 76999F: CUSTOM | Mod: 26

## 2025-04-03 PROCEDURE — 55706 BX PRST8 NDL SAT SAMPLING: CPT | Mod: AS

## 2025-04-03 PROCEDURE — G0416: CPT | Mod: 26

## 2025-04-03 PROCEDURE — 55706 BX PRST8 NDL SAT SAMPLING: CPT

## 2025-04-03 RX ORDER — ACETAMINOPHEN 500 MG/5ML
1000 LIQUID (ML) ORAL ONCE
Refills: 0 | Status: COMPLETED | OUTPATIENT
Start: 2025-04-03 | End: 2025-04-03

## 2025-04-03 RX ADMIN — Medication 1000 MILLIGRAM(S): at 09:05

## 2025-04-03 NOTE — BRIEF OPERATIVE NOTE - NSICDXBRIEFPROCEDURE_GEN_ALL_CORE_FT
PROCEDURES:  Biopsy of prostate by transperineal approach with integrated magnetic resonance-ultrasound guidance 03-Apr-2025 08:51:53  Aura Alford

## 2025-04-03 NOTE — ASU DISCHARGE PLAN (ADULT/PEDIATRIC) - CARE PROVIDER_API CALL
Shellie Forbes  Urology  [address] [facility] [place] [address]  Phone: [phone]  Fax: [phone]  Follow Up Time:

## 2025-04-03 NOTE — ASU PREOP CHECKLIST - SPO2 (%)
"  Patient:  Sylvester Carvalho  YOB: 1945  MRN: [id number]       Chief Complaint/Active Symptoms:     F/U S/P cabg X3 WITH A-FIB  Sylvester Carvalho is a 78 y.o. male who returns today for cardiac follow-up.  Patient had open heart surgery approximately 4 weeks ago at [facility] by Dr. Chung.  3 vessels were performed including LIMA to LAD vein graft PL and vein graft to the diagonal.  He is well at home.  Having no chest pain or shortness of breath.  No palpitation or dizziness.  He is planning to enter into cardiac rehab this week.  He is on the listed medications below which we will be adjusting accordingly.      Objective:     Vitals:    03/26/24 1237   BP: 124/84   Pulse: 55       Vitals:    03/26/24 1237   Weight: 82.2 kg (181 lb 3.2 oz)       Allergies:     Allergies   Allergen Reactions    Penicillins Swelling     \"Whole body swelled up\"          Medications:     Current Outpatient Medications   Medication Instructions    amiodarone (PACERONE) 200 mg, oral, 2 times daily, Take 2 tablets by mouth twice daily starting with evening dose 2/28/24. Decrease to 1 table by mouth once daily on 3/6/2024.    apixaban (ELIQUIS) 5 mg, oral, Every 12 hours    aspirin 81 mg, oral, Daily    atorvastatin (LIPITOR) 40 mg, oral, Daily    cholecalciferol (Vitamin D-3) 50 MCG (2000 UT) tablet 1 tablet, oral, Daily    iron polysaccharides (NU-IRON,NIFEREX) 150 mg, oral, Daily    magnesium oxide (MAG-OX) 400 mg, oral, Daily    metoprolol tartrate (LOPRESSOR) 25 mg, oral, 2 times daily    mv-min/FA/vit K/lutein/zeaxant (PRESERVISION AREDS 2 PLUS MV ORAL) oral    nitroglycerin (NITROSTAT) 0.4 mg, sublingual, Every 5 min PRN    pantoprazole (PROTONIX) 40 mg, oral, Daily before breakfast, Do not crush, chew, or split.       Physical Examination:   Constitutional:       Appearance: Healthy appearance. Not in distress.   Neck:      Vascular: No JVR. JVD normal.   Pulmonary:      Effort: Pulmonary effort is normal.      " "Breath sounds: Normal breath sounds. No wheezing. No rhonchi. No rales.   Chest:      Chest wall: Not tender to palpatation.      Comments: Well-healed sternotomy scar with various other small surgical incisions identified.  Cardiovascular:      PMI at left midclavicular line. Normal rate. Regular rhythm. Normal S1. Normal S2.       Murmurs: There is no murmur.      No gallop.  No click. No rub.   Pulses:     Intact distal pulses.   Edema:     Peripheral edema absent.   Abdominal:      General: Bowel sounds are normal.      Palpations: Abdomen is soft.      Tenderness: There is no abdominal tenderness.   Musculoskeletal: Normal range of motion.         General: No tenderness. Skin:     General: Skin is warm and dry.   Neurological:      General: No focal deficit present.      Mental Status: Alert and oriented to person, place and time.            Lab:     CBC:   Lab Results   Component Value Date    WBC 7.1 02/28/2024    RBC 2.93 (L) 02/28/2024    HGB 9.4 (L) 02/28/2024    HCT 27.5 (L) 02/28/2024     02/28/2024        CMP:    Lab Results   Component Value Date     02/28/2024    K 4.1 02/28/2024     02/28/2024    CO2 23 02/28/2024    BUN 27 (H) 02/28/2024    CREATININE 0.98 02/28/2024    GLUCOSE 110 (H) 02/28/2024    CALCIUM 8.9 02/28/2024       Magnesium:    Lab Results   Component Value Date    MG 2.08 02/28/2024       Lipid Profile:    Lab Results   Component Value Date    TRIG 190 (H) 01/31/2024    HDL 36.9 01/31/2024    LDLCALC 89 01/31/2024       TSH:    Lab Results   Component Value Date    TSH 3.19 02/20/2024       BNP:   No results found for: \"BNP\"     PT/INR:    Lab Results   Component Value Date    PROTIME 14.8 (H) 02/22/2024    INR 1.3 (H) 02/22/2024       HgBA1c:    Lab Results   Component Value Date    HGBA1C 5.6 02/20/2024       BMP:  Lab Results   Component Value Date     02/28/2024     02/27/2024     02/26/2024    K 4.1 02/28/2024    K 3.5 02/27/2024    K 3.9 " "02/26/2024     02/28/2024     02/27/2024     02/26/2024    CO2 23 02/28/2024    CO2 26 02/27/2024    CO2 28 02/26/2024    BUN 27 (H) 02/28/2024    BUN 26 (H) 02/27/2024    BUN 21 02/26/2024    CREATININE 0.98 02/28/2024    CREATININE 1.08 02/27/2024    CREATININE 0.92 02/26/2024       CBC:  Lab Results   Component Value Date    WBC 7.1 02/28/2024    WBC 7.4 02/27/2024    WBC 6.7 02/26/2024    RBC 2.93 (L) 02/28/2024    RBC 3.06 (L) 02/27/2024    RBC 2.82 (L) 02/26/2024    HGB 9.4 (L) 02/28/2024    HGB 9.8 (L) 02/27/2024    HGB 8.9 (L) 02/26/2024    HCT 27.5 (L) 02/28/2024    HCT 29.2 (L) 02/27/2024    HCT 26.9 (L) 02/26/2024    MCV 94 02/28/2024    MCV 95 02/27/2024    MCV 95 02/26/2024    MCH 32.1 02/28/2024    MCH 32.0 02/27/2024    MCH 31.6 02/26/2024    MCHC 34.2 02/28/2024    MCHC 33.6 02/27/2024    MCHC 33.1 02/26/2024    RDW 12.8 02/28/2024    RDW 12.9 02/27/2024    RDW 13.0 02/26/2024     02/28/2024     02/27/2024     (L) 02/26/2024       Cardiac Enzymes:    No results found for: \"TROPHS\"    Hepatic Function Panel:    Lab Results   Component Value Date    ALKPHOS 60 02/20/2024    ALT 18 02/20/2024    AST 19 02/20/2024    PROT 7.0 02/20/2024    BILITOT 0.9 02/20/2024         Diagnostic Studies:     ECG 12 Lead    Result Date: 2/28/2024  Atrial flutter   WITH VARIABLE BLOCK with premature ventricular or aberrantly conducted complexes Nonspecific ST and T wave abnormality Prolonged QT Abnormal ECG Confirmed by Arnoldo Pederson ([id number]) on 2/28/2024 6:35:38 PM    ECG 12 lead    Result Date: 2/27/2024  Normal sinus rhythm ST elevation, consider inferior injury or acute infarct Prolonged QT Abnormal ECG Confirmed by Luis Angel Orlando ([id number]) on 2/27/2024 2:34:15 PM    ECG 12 lead    Result Date: 2/27/2024  Atrial fibrillation with rapid ventricular response with premature ventricular or aberrantly conducted complexes Nonspecific T wave abnormality Abnormal ECG When compared with ECG " "of 2024 06:40, (unconfirmed) Atrial fibrillation has replaced Sinus rhythm Vent. rate has increased BY  37 BPM Confirmed by Luis Angel Orlando ([id number]) on 2024 2:33:35 PM    ECG 12 Lead    Result Date: 2024  Normal sinus rhythm Nonspecific ST and T wave abnormality Abnormal ECG When compared with ECG of 2024 06:28, (unconfirmed) Premature ventricular complexes are no longer Present Confirmed by Luis Angel Orlando ([id number]) on 2024 2:32:39 PM    XR chest 1 view    Result Date: 2024  Interpreted By:  Jerel Malagon, STUDY: XR CHEST 1 VIEW;  2024 5:40 am   INDICATION: Signs/Symptoms:post op cardiac surgery.   COMPARISON: Portable chest, 2024   ACCESSION NUMBER(S): [id number]   ORDERING CLINICIAN: MIL PRETTY   TECHNIQUE: Single frontal view of the chest; Portable technique   FINDINGS: Right central line has been removed   No other interval change   The cardiomediastinal silhouette is unchanged   Wedge-shaped focal small area of atelectasis in the periphery of the left midlung where chest tube previously resided, not an unexpected radiographic finding   No acute airspace disease   No large pleural effusion or demonstrable pneumothorax       No acute unexpected radiographic findings after cardiac surgery and more recent removal of the right IJ central line   MACRO: None   Signed by: Jerel Malagon 2024 7:57 AM Dictation workstation:   [id number]      EKG:   No results found for: \"EKG\"      Radiology:     No orders to display     Creation Bypass Graft Coronary ArteryX3(LIMA-LAD, SVG-PL, SVG-DIAG); EVH, LAUREN, Operative Note     Date: 2024                      OR Location: [facility]     Name: Sylvester Carvalho, : 1945, Age: 78 y.o., MRN: [id number], Sex: male     Diagnosis  Pre-op Diagnosis     * Coronary artery disease involving native coronary artery of native heart, unspecified whether angina present [I25.10] Post-op Diagnosis     * Coronary artery disease involving native " coronary artery of native heart, unspecified whether angina present [I25.10]      Procedures  Creation Bypass Graft Coronary ArteryX3(LIMA-LAD, SVG-PL, SVG-DIAG); EVH, LAUREN,  [id number] - MT CABG W/ARTERIAL GRAFT TWO ARTERIAL GRAFTS        Surgeons      * Amarilys Chung - Primary     Resident/Fellow/Other Assistant:  Surgeon(s) and Role:     * Hugo Johnston MD - Assisting     Procedure Summary  Anesthesia: General               ASA: IV  Anesthesia Staff: Anesthesiologist: Venkata Hagen MD  CRNA: NATE Crews-CRNA  Perfusionist: Tabatha Laird  Estimated Blood Loss: 250 mL  Intra-op Medications:       Administrations occurring from 0730 to 1225 on 02/22/24:   Medication Name Total Dose   papaverine injection 60 mg   sodium chloride 0.9 % irrigation solution 4,000 mL   vancomycin (Vancocin) vial for injection 4 g   heparin 10,000 Units in sodium chloride 0.9 % 1,000 mL irrigation 2,000 mL   aminocaproic acid (Amicar) infusion 10.42 g   heparin 1,000 unit/mL injection 32,000 Units 32,000 Units   protamine 420 mg in sodium chloride 0.9% 50 mL  mg   vancomycin (Vancocin) in dextrose 5 % water (D5W) 250 mL IV 1,250 mg 1.25 g                   Anesthesia Record                   Intraprocedure I/O Totals                    Intake     Transfuse Platelets 284.00 mL     LR bolus 1500.00 mL     NaCl 0.9 % bolus 1000.00 mL     Norepinephrine Drip 0.00 mL     The total shown is the total volume documented since Anesthesia Start was filed.     Aminocaproic Acid Drip 0.00 mL     The total shown is the total volume documented since Anesthesia Start was filed.     Phenylephrine Drip 0.00 mL     The total shown is the total volume documented since Anesthesia Start was filed.     protamine 420 mg in sodium chloride 0.9% 50 mL IV 92.00 mL     Cell Saver 372 mL     Total Intake 3248 mL           Output     Urine 575 mL     Total Output 575 mL           Net     Net Volume 2673 mL             Specimen: No  specimens collected      Staff:   Circulator: Pearl Alva RN  Relief Circulator: Camron Ho RN  Scrub Person: TAMIA Reyes        Tourniquet Times:          Implants:  Implants         Type Name Action Serial No.       Other Cardiac Implant CARDIOPLEGIA SET - [id number] Implanted                       Indications: Sylvester Carvalho is an 78 y.o. male who is having surgery for coronary artery disease  The patient was seen in the preoperative area. The risks, benefits, complications, treatment options, non-operative alternatives, expected recovery and outcomes were discussed with the patient. The possibilities of reaction to medication, pulmonary aspiration, injury to surrounding structures, bleeding, recurrent infection, the need for additional procedures, failure to diagnose a condition, and creating a complication requiring transfusion or operation were discussed with the patient. The patient concurred with the proposed plan, giving informed consent.  The site of surgery was properly noted/marked if necessary per policy. The patient has been actively warmed in preoperative area. Preoperative antibiotics have been ordered and given within 1 hours of incision. Venous thrombosis prophylaxis are not indicated.     Procedure Details: This is a PREOPERATIVE DIAGNOSIS:  Pre-Op Diagnosis Codes:     * Coronary artery disease involving native coronary artery of native heart, unspecified whether angina present [I25.10]     POSTOPERATIVE DIAGNOSIS:  same     OPERATION/PROCEDURE:  1. Creation Bypass Graft Coronary ArteryX3(LIMA-LAD, SVG-PL, SVG-DIAG); EVH, LAUREN,      SURGEON:     * Amarilys Chung - Primary     * Hugo Johnston - Assisting   There was no qualified resident available so Dr. Dumont was asked to assist, he performed the sternotomy harvested the mammary artery assisted through the whole case and did the proximal anastomosis of the vein to the posterolateral artery on the aorta         ANESTHESIA TYPE:  General     ANESTHESIOLOGIST:  Procedure(s) and Anesthesia Type:     * Creation Bypass Graft Coronary ArteryX3(LIMA-LAD, SVG-PL, SVG-DIAG); EVH, LAUREN, - General     ANESTHESIA STAFF  Anesthesiologist: Venkata Hagen MD  CRNA: NATE Crews-NUNO  Perfusionist: Tabatha Laird      BYPASS:  78 minutes     CROSSCLAMP:  70     EBL:     250 mL      OPERATIVE INDICATIONS:  The patient is a 78-year-old with symptomatic coronary artery disease.  Coronary angiography demonstrated 3 vessels coronary artery disease.  Echocardiography demonstrated normal left ventricular function and no significant valvular heart disease.  The patient was referred for coronary artery bypass grafting.     OPERATIVE FINDINGS:  There were no pericardial adhesions or effusions.  The ascending aorta was soft without evidence of atherosclerosis. The patient had severe diffuse coronary artery disease, however we were able to find locations on the coronary arteries that was suitable for grafting.     OPERATIVE PROCEDURE:  The patient was brought to the operating room, placed on the operating table in supine position.  General endotracheal anesthesia and hemodynamic monitoring were established.  The patient was prepped and draped in standard fashion. A median sternotomy was performed.  The LIMA was harvested in a usual manner.   The vein was harvested endoscopically from the left lower limb.  The thymus was divided and the pericardium was opened.  The ascending aorta was palpated and it was without evidence of atherosclerosis.  The patient was heparinized.  The ascending aorta and right atrium were cannulated for cardiopulmonary bypass and antegrade and retrograde cardioplegia cannulas were placed.  The patient was placed on cardiopulmonary bypass, the ascending aorta was crossclamped, and the heart was arrested and protected with cold blood cardioplegia.  During the remainder of the cross-clamp time, cold blood  cardioplegia was administered every 15 to 20 minutes.       GRAFTS:   LIMA- LAD  SVG- D1  SVG-PL of RCA        Graft 1, PL:  This vessel was opened proximally, which was 1.5 mm in diameter, free from disease at the point of entry.  A length of reverse saphenous vein was anastomosed end-to-side using continuous 7-0 Prolene.      Graft 2, D1:    This vessel was opened in its mid course which was 1.5 mm in diameter, free from disease at the point of entry. The length of reverse saphenous vein was anastomosed end-to-side using continuous 7-0 Prolene.      Graft 3, LAD:    This vessel was opened in its mid to distal third, which was 1.5 mm in diameter, free from disease at the point of entry. The LIMA was anastomosed end-to-side using continuous 8-0 Prolene.      The proximal end of the vein grafts was anastomosed to punch holes into the ascending aorta using continuous 6-0 Prolene sutures.      Assessment of graft flows demonstrated good flows in all the grafts.     Cardiopulmonary bypass was weaned uneventfully.  One right ventricular pacing wires were applied. Protamine was administered to reverse systemic anticoagulation. Hemostasis was secured.      Two drains were inserted, 1 in the left pleural space and 1 in the mediastinum.  The sternum was closed with interrupted stainless steel wires and subcutaneous layers were closed using Vicryl and skin was closed using subcuticular Vicryl.       Complications:  None; patient tolerated the procedure well.    Disposition: ICU - intubated and hemodynamically stable.  Condition: stable         Attending Attestation: I was present and scrubbed for the entire procedure.     Amarilys Chung  Phone Number: [phone]  Assessment/Plan:         Patient Active Problem List   Diagnosis    History of squamous cell carcinoma    Allergic rhinitis    Atypical nevus    Benign hypertension    Cataract, nuclear sclerotic, both eyes    Diverticulosis of colon    Enlarged prostate  with lower urinary tract symptoms (LUTS)    Eustachian tube dysfunction    Exudative age-related macular degeneration of both eyes with active choroidal neovascularization (CMS/HCC)    GERD (gastroesophageal reflux disease)    HLD (hyperlipidemia)    Hordeolum externum (stye)    Hyperbilirubinemia    Inflamed skin tag    Internal hordeolum of left eye    Macular degeneration    Both eyes affected by degenerative myopia with choroidal neovascularization    Osteoarthrosis    BMI 29.0-29.9,adult    Vitamin D deficiency    Pencilling of stools    Encounter for screening for malignant neoplasm of colon    Cyst of bone of right hand    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    Impression:  ASHD class II  Status post three-vessel coronary artery bypass surgery including LIMA to the LAD vein graft to the posterolateral branch and vein graft to the diagonal  Former smoker  Per lipidemia  Hypertension control  Paroxysmal atrial fibrillation perioperatively only  PLAN   Recommendation:  Will continue amiodarone and apixaban for 1 more month  Patient answered to cardiac rehab.  In the absence of any arrhythmias at that time we will discontinue on his next visit here in 4 to 5 weeks  Other meds to continue as before  Increase exercise and activity  Okay to drive  Call if any issues or problems otherwise develop  See me in approximately 4 to 5 weeks                 97

## 2025-04-03 NOTE — ASU DISCHARGE PLAN (ADULT/PEDIATRIC) - FINANCIAL ASSISTANCE
[facility] provides services at a reduced cost to those who are determined to be eligible through [facility]’s financial assistance program. Information regarding [facility]’s financial assistance program can be found by going to [email or web address] or by calling [phone].

## 2025-04-03 NOTE — ASU DISCHARGE PLAN (ADULT/PEDIATRIC) - ASU DC SPECIAL INSTRUCTIONSFT
PROSTATE BIOPSY    GENERAL: Expect blood in the urine, stool, and ejaculate for up to two (2) months postoperatively. This is normal and to be expected after this procedure. Increase hydration to keep the urine as clear as possible.    SURGICAL WOUND: There are often lumps and bumps that can be felt in the perineum (skin between scrotum and anus) for up to two (2) months or more post operatively. These are of no concern and with time they will soften and disappear.  Any “black and blue” bruising areas will also resolve.  You may apply an ice-pack for 15 minutes out of every hour for the first 24 -36 hours to minimize pain and swelling. You may apply over the counter Bacitracin to the wound several times a day, and keep covered with over the counter gauze as necessary.    CATHETER: Some patients are sent home with a Kay catheter, while others go home urinating on their own. A Kay catheter continuously drains the urine from the bladder. If you still have a catheter, the nurses will review instructions and care before you go home.     PAIN: You may have some intermittent pain for up to six (6) weeks post operatively. Pain does not signify any problem unless associated with fever, chills, or inability to void.  If you experience any fevers or chills please call immediately as this may be signs of an infection. You may take Tylenol (acetaminophen) 650-975mg and/or Motrin (ibuprofen) 400-600mg, both available over the counter, for pain every 6 hours as needed. Do not exceed 4000mg of Tylenol (acetaminophen) daily. You may alternate these medications such that you take one or the other every 3 hours for around the clock pain coverage.    ANTICOAGULATION: If you are taking any blood thinning medications, please discuss with your urologist prior to restarting these medications unless otherwise specified. You may resume Xarelto 2 days after your urine is clear.    BATHING: You may shower with soap and water, and pat dry the needle insertion sites in the perineum. Avoid sitting in water for prolonged periods of time for the next few days.    DIET: You may resume your regular diet and regular medication regimen.    ACTIVITY: No heavy lifting or strenuous exercise until you are evaluated at your post-operative appointment. Otherwise, you may return to your usual level of physical activity.    FOLLOW-UP: If you did not already schedule your post-operative appointment, please call your urologist to schedule and follow-up appointment.    CALL YOUR UROLOGIST IF: You have any bleeding that does not stop, inability to void >8 hours, fever over 100.4 F, chills, persistent nausea/vomiting, changes in your incision concerning for infection, or if your pain is not controlled on your discharge pain medications.

## 2025-04-04 ENCOUNTER — NON-APPOINTMENT (OUTPATIENT)
Age: 60
End: 2025-04-04

## 2025-04-08 ENCOUNTER — NON-APPOINTMENT (OUTPATIENT)
Age: 60
End: 2025-04-08

## 2025-04-10 NOTE — PHYSICAL THERAPY INITIAL EVALUATION ADULT - NSPTDMEREC_GEN_A_CORE
229.9 Pt will require rolling walker at home due to their dx of laparoscopic low anterior resection  to help complete MRADL's (mobility related activity of daily living)./rolling walker

## 2025-04-22 DIAGNOSIS — K46.9 UNSPECIFIED ABDOMINAL HERNIA W/OUT OBSTRUCTION OR GANGRENE: ICD-10-CM

## 2025-04-22 DIAGNOSIS — R10.9 UNSPECIFIED ABDOMINAL PAIN: ICD-10-CM

## 2025-05-13 ENCOUNTER — APPOINTMENT (OUTPATIENT)
Dept: SURGERY | Facility: CLINIC | Age: 60
End: 2025-05-13
Payer: COMMERCIAL

## 2025-05-13 VITALS
DIASTOLIC BLOOD PRESSURE: 83 MMHG | BODY MASS INDEX: 32.93 KG/M2 | HEART RATE: 69 BPM | HEIGHT: 70 IN | WEIGHT: 230 LBS | RESPIRATION RATE: 17 BRPM | OXYGEN SATURATION: 97 % | SYSTOLIC BLOOD PRESSURE: 147 MMHG | TEMPERATURE: 97.1 F

## 2025-05-13 DIAGNOSIS — K43.2 INCISIONAL HERNIA W/OUT OBSTRUCTION OR GANGRENE: ICD-10-CM

## 2025-05-13 PROCEDURE — 99203 OFFICE O/P NEW LOW 30 MIN: CPT

## 2025-05-23 ENCOUNTER — OUTPATIENT (OUTPATIENT)
Dept: OUTPATIENT SERVICES | Facility: HOSPITAL | Age: 60
LOS: 1 days | End: 2025-05-23

## 2025-05-23 ENCOUNTER — APPOINTMENT (OUTPATIENT)
Dept: INTERNAL MEDICINE | Facility: CLINIC | Age: 60
End: 2025-05-23

## 2025-05-23 DIAGNOSIS — Z98.890 OTHER SPECIFIED POSTPROCEDURAL STATES: Chronic | ICD-10-CM

## 2025-05-23 DIAGNOSIS — Z90.89 ACQUIRED ABSENCE OF OTHER ORGANS: Chronic | ICD-10-CM

## 2025-05-27 ENCOUNTER — APPOINTMENT (OUTPATIENT)
Dept: SURGERY | Facility: CLINIC | Age: 60
End: 2025-05-27
Payer: COMMERCIAL

## 2025-05-27 VITALS
OXYGEN SATURATION: 98 % | HEIGHT: 70 IN | RESPIRATION RATE: 16 BRPM | SYSTOLIC BLOOD PRESSURE: 145 MMHG | HEART RATE: 73 BPM | TEMPERATURE: 97.9 F | WEIGHT: 230 LBS | BODY MASS INDEX: 32.93 KG/M2 | DIASTOLIC BLOOD PRESSURE: 89 MMHG

## 2025-05-27 DIAGNOSIS — E66.9 OBESITY, UNSPECIFIED: ICD-10-CM

## 2025-05-27 DIAGNOSIS — K43.2 INCISIONAL HERNIA W/OUT OBSTRUCTION OR GANGRENE: ICD-10-CM

## 2025-05-27 PROCEDURE — 99215 OFFICE O/P EST HI 40 MIN: CPT

## 2025-05-27 RX ORDER — TIRZEPATIDE 2.5 MG/.5ML
2.5 INJECTION, SOLUTION SUBCUTANEOUS
Qty: 1 | Refills: 0 | Status: ACTIVE | COMMUNITY
Start: 2025-05-27

## 2025-05-29 ENCOUNTER — APPOINTMENT (OUTPATIENT)
Dept: SURGERY | Facility: CLINIC | Age: 60
End: 2025-05-29

## 2025-06-06 ENCOUNTER — APPOINTMENT (OUTPATIENT)
Dept: SURGERY | Facility: CLINIC | Age: 60
End: 2025-06-06
Payer: COMMERCIAL

## 2025-06-06 PROCEDURE — 97802 MEDICAL NUTRITION INDIV IN: CPT | Mod: 95

## 2025-06-18 VITALS — BODY MASS INDEX: 33.39 KG/M2 | WEIGHT: 233.2 LBS | HEIGHT: 70 IN

## 2025-07-01 ENCOUNTER — NON-APPOINTMENT (OUTPATIENT)
Age: 60
End: 2025-07-01

## 2025-07-22 ENCOUNTER — APPOINTMENT (OUTPATIENT)
Dept: SURGERY | Facility: CLINIC | Age: 60
End: 2025-07-22

## 2025-07-22 ENCOUNTER — APPOINTMENT (OUTPATIENT)
Dept: SURGERY | Facility: CLINIC | Age: 60
End: 2025-07-22
Payer: COMMERCIAL

## 2025-07-22 DIAGNOSIS — K43.2 INCISIONAL HERNIA W/OUT OBSTRUCTION OR GANGRENE: ICD-10-CM

## 2025-07-22 DIAGNOSIS — E66.9 OBESITY, UNSPECIFIED: ICD-10-CM

## 2025-07-22 PROCEDURE — 99214 OFFICE O/P EST MOD 30 MIN: CPT | Mod: 95

## 2025-07-24 RX ORDER — TIRZEPATIDE 7.5 MG/.5ML
7.5 INJECTION, SOLUTION SUBCUTANEOUS
Qty: 4 | Refills: 0 | Status: ACTIVE | COMMUNITY
Start: 2025-07-22 | End: 1900-01-01

## 2025-07-30 ENCOUNTER — NON-APPOINTMENT (OUTPATIENT)
Age: 60
End: 2025-07-30

## 2025-08-04 RX ORDER — SEMAGLUTIDE 1 MG/.5ML
1 INJECTION, SOLUTION SUBCUTANEOUS
Qty: 1 | Refills: 0 | Status: ACTIVE | COMMUNITY
Start: 2025-08-04 | End: 1900-01-01

## 2025-08-12 ENCOUNTER — OFFICE (OUTPATIENT)
Dept: URBAN - METROPOLITAN AREA CLINIC 63 | Facility: CLINIC | Age: 60
Setting detail: OPHTHALMOLOGY
End: 2025-08-12
Payer: COMMERCIAL

## 2025-08-12 DIAGNOSIS — H52.4: ICD-10-CM

## 2025-08-12 DIAGNOSIS — H02.831: ICD-10-CM

## 2025-08-12 DIAGNOSIS — H01.005: ICD-10-CM

## 2025-08-12 DIAGNOSIS — H01.002: ICD-10-CM

## 2025-08-12 DIAGNOSIS — H25.13: ICD-10-CM

## 2025-08-12 DIAGNOSIS — H01.004: ICD-10-CM

## 2025-08-12 DIAGNOSIS — H01.001: ICD-10-CM

## 2025-08-12 DIAGNOSIS — H02.834: ICD-10-CM

## 2025-08-12 DIAGNOSIS — H16.223: ICD-10-CM

## 2025-08-12 DIAGNOSIS — H40.013: ICD-10-CM

## 2025-08-12 PROCEDURE — 92014 COMPRE OPH EXAM EST PT 1/>: CPT | Performed by: INTERNAL MEDICINE

## 2025-08-12 PROCEDURE — 92015 DETERMINE REFRACTIVE STATE: CPT | Performed by: INTERNAL MEDICINE

## 2025-08-12 PROCEDURE — 92133 CPTRZD OPH DX IMG PST SGM ON: CPT | Performed by: INTERNAL MEDICINE

## 2025-08-12 ASSESSMENT — KERATOMETRY
OD_K2POWER_DIOPTERS: 42.25
OS_AXISANGLE_DEGREES: 088
OD_AXISANGLE_DEGREES: 090
OD_K1POWER_DIOPTERS: 42.00
OS_K1POWER_DIOPTERS: 42.25
OS_K2POWER_DIOPTERS: 42.50

## 2025-08-12 ASSESSMENT — REFRACTION_CURRENTRX
OD_CYLINDER: 0.00
OS_VPRISM_DIRECTION: PROGS
OD_AXIS: 180
OS_CYLINDER: 0.00
OD_SPHERE: +2.50
OS_AXIS: 180
OD_SPHERE: +0.50
OD_ADD: +2.00
OS_SPHERE: +2.50
OD_VPRISM_DIRECTION: PROGS
OS_OVR_VA: 20/
OD_OVR_VA: 20/
OD_OVR_VA: 20/
OS_SPHERE: +0.50
OS_OVR_VA: 20/
OS_ADD: +2.00

## 2025-08-12 ASSESSMENT — REFRACTION_MANIFEST
OS_SPHERE: +0.50
OD_VA1: 20/20
OD_ADD: +2.00
OS_ADD: +2.00
OD_SPHERE: +0.50
OD_SPHERE: +0.50
OS_SPHERE: +0.50
OS_CYLINDER: -0.25
OD_CYLINDER: -0.25
OS_AXIS: 180
OS_VA2: 20/20(J1+)
OD_ADD: +2.50
OS_ADD: +2.50
OD_VA1: 20/20
OS_VA1: 20/20
OD_AXIS: 070
OD_AXIS: 180
OS_CYLINDER: -0.25
OS_VA1: 20/20
OD_SPHERE: +0.75
OS_SPHERE: +0.50
OD_VA1: 20/20
OD_ADD: +2.75
OS_AXIS: 0
OS_ADD: +2.00
OS_ADD: +2.75
OS_AXIS: 075
OS_VA1: 20/20
OS_SPHERE: +0.75
OD_VA2: 20/20(J1+)
OD_ADD: +2.00
OU_VA: 20/20
OS_CYLINDER: SPHERE
OD_AXIS: 075
OD_CYLINDER: -0.25
OD_CYLINDER: SPHERE
OD_SPHERE: +0.75

## 2025-08-12 ASSESSMENT — VISUAL ACUITY
OS_BCVA: 20/20
OD_BCVA: 20/20

## 2025-08-12 ASSESSMENT — PACHYMETRY
OS_CT_CORRECTION: -2
OS_CT_UM: 572
OD_CT_UM: 577
OD_CT_CORRECTION: -2

## 2025-08-12 ASSESSMENT — LID EXAM ASSESSMENTS
OD_BLEPHARITIS: RLL RUL 1+
OS_BLEPHARITIS: LLL LUL 1+

## 2025-08-12 ASSESSMENT — REFRACTION_AUTOREFRACTION
OS_AXIS: 074
OD_AXIS: 076
OS_CYLINDER: -0.25
OD_SPHERE: +0.75
OS_SPHERE: +0.75
OD_CYLINDER: -0.25

## 2025-08-12 ASSESSMENT — SUPERFICIAL PUNCTATE KERATITIS (SPK)
OS_SPK: 1+ 2+
OD_SPK: 1+ 2+

## 2025-08-12 ASSESSMENT — TONOMETRY
OD_IOP_MMHG: 20
OD_IOP_MMHG: 16
OS_IOP_MMHG: 20
OS_IOP_MMHG: 16

## 2025-08-12 ASSESSMENT — CONFRONTATIONAL VISUAL FIELD TEST (CVF)
OS_FINDINGS: FULL
OD_FINDINGS: FULL

## 2025-08-12 ASSESSMENT — LID POSITION - DERMATOCHALASIS
OS_DERMATOCHALASIS: LUL 2+
OD_DERMATOCHALASIS: RUL 2+

## 2025-08-20 ENCOUNTER — APPOINTMENT (OUTPATIENT)
Dept: UROLOGY | Facility: CLINIC | Age: 60
End: 2025-08-20

## 2025-09-08 ENCOUNTER — NON-APPOINTMENT (OUTPATIENT)
Age: 60
End: 2025-09-08

## 2025-09-08 VITALS — BODY MASS INDEX: 32.33 KG/M2 | HEIGHT: 70 IN | WEIGHT: 225.8 LBS

## (undated) DEVICE — Device

## (undated) DEVICE — DRAPE MAYO STAND 30"

## (undated) DEVICE — DRSG CURITY GAUZE SPONGE 4 X 4" 12-PLY

## (undated) DEVICE — DRSG OPSITE 13.75 X 4"

## (undated) DEVICE — VISITEC 4X4

## (undated) DEVICE — ACMI SELF-SEALING SEAL UP TO 7FR

## (undated) DEVICE — SUT CHROMIC 1 30" GS-21

## (undated) DEVICE — SOL IRR POUR NS 0.9% 500ML

## (undated) DEVICE — GOWN TRIMAX LG

## (undated) DEVICE — PACK MAJOR ABDOMINAL SUPINE

## (undated) DEVICE — GRID BRACHYTHERAPY EZ 18G

## (undated) DEVICE — DRSG TAPE UMBILICAL COTTON 2" X 30 X 1/8"

## (undated) DEVICE — DRSG OPSITE 2.5 X 2"

## (undated) DEVICE — D HELP - CLEARVIEW CLEARIFY SYSTEM

## (undated) DEVICE — VALVE YELLOW PORT SEAL PLUS 5MM

## (undated) DEVICE — BLADE SCALPEL SAFETYLOCK #10

## (undated) DEVICE — TUBING THERMADX UROLOGY

## (undated) DEVICE — SPECIMEN CONTAINER 100ML

## (undated) DEVICE — SUT SOFSILK 3-0 18" V-20 (POP-OFF)

## (undated) DEVICE — GLV 8 PROTEXIS (WHITE)

## (undated) DEVICE — DRAPE INSTRUMENT POUCH 6.75" X 11"

## (undated) DEVICE — ADAPTER URETHRAL CATH CONNECTING

## (undated) DEVICE — MEDICATION LABELS W MARKER

## (undated) DEVICE — VENODYNE/SCD SLEEVE CALF MEDIUM

## (undated) DEVICE — FOLEY CATH 2-WAY 28FR 30CC SILICONE

## (undated) DEVICE — SUT MAXON 1 36" GS-24

## (undated) DEVICE — GLV 7 PROTEXIS (WHITE)

## (undated) DEVICE — BLADE SCALPEL SAFETYLOCK #15

## (undated) DEVICE — DRSG TEGADERM 6 X 8"

## (undated) DEVICE — DRAPE TOWEL BLUE 17" X 24"

## (undated) DEVICE — STAPLER SKIN VISI-STAT 35 WIDE

## (undated) DEVICE — DRAPE GENERAL ENDOSCOPY

## (undated) DEVICE — SUT PDS II 1 54" TP-1

## (undated) DEVICE — POSITIONER FOAM EGG CRATE ULNAR 2PCS (PINK)

## (undated) DEVICE — KIT SPINAL DRUGS NDL 25G X 3.5IN

## (undated) DEVICE — SOL IRR POUR H2O 250ML

## (undated) DEVICE — PREP CHLORAPREP HI-LITE ORANGE 26ML

## (undated) DEVICE — TAPE SILK 3"

## (undated) DEVICE — SUT CHROMIC 3-0 30" V-20

## (undated) DEVICE — PACK COLON BUNDLE

## (undated) DEVICE — SUT SURGIPRO 0 30" GS-22

## (undated) DEVICE — TROCAR APPLIED MEDICAL KII BALLOON BLUNT TIP 12MM X 100MM

## (undated) DEVICE — TROCAR COVIDIEN VERSAPORT BLADELESS OPTICAL 5MM STANDARD

## (undated) DEVICE — SUT POLYSORB 3-0 30" V-20 UNDYED

## (undated) DEVICE — PREP BETADINE KIT

## (undated) DEVICE — SYR LUER LOK 50CC

## (undated) DEVICE — DRSG TELFA 3 X 8

## (undated) DEVICE — PACK BASIN SPECIAL PROCEDURE

## (undated) DEVICE — LIGASURE IMPACT

## (undated) DEVICE — NDL HYPO REGULAR BEVEL 25G X 1.5" (BLUE)

## (undated) DEVICE — FOLEY TRAY 16FR 5CC LTX UMETER CLOSED

## (undated) DEVICE — MARKING PEN W RULER

## (undated) DEVICE — ELCTR BOVIE PENCIL SMOKE EVACUATION

## (undated) DEVICE — GLV 8.5 PROTEXIS (WHITE)

## (undated) DEVICE — DRAPE LINGEMAN TUR

## (undated) DEVICE — BALLOON ENDOCAVITY 2X14CM

## (undated) DEVICE — GELPORT LAPAROSCOPIC SYSTEM

## (undated) DEVICE — GLV 7.5 PROTEXIS (WHITE)

## (undated) DEVICE — OSTOMY KIT 2-PIECE 2.25" NS (RED)

## (undated) DEVICE — SYR TOOMEY 50ML

## (undated) DEVICE — PLASTIC SOLUTION BOWL 160Z

## (undated) DEVICE — NDL MAX CORE 18G X 25CM

## (undated) DEVICE — WARMING BLANKET FULL ADULT

## (undated) DEVICE — DRAPE LEGGINGS XL

## (undated) DEVICE — DRAPE MEDIUM SHEET 44" X 70"

## (undated) DEVICE — LIGASURE BLUNT TIP 37CM

## (undated) DEVICE — DRAPE 1/2 SHEET 40X57"

## (undated) DEVICE — SUT POLYSORB 0 36" GU-46

## (undated) DEVICE — TUBING STRYKEFLOW II SUCTION / IRRIGATOR

## (undated) DEVICE — SUT CHROMIC 0 36" GS-21

## (undated) DEVICE — CATH NG SALEM SUMP 16FR

## (undated) DEVICE — SOL IRR BAG NS 0.9% 3000ML

## (undated) DEVICE — NDL BIOPSY CHIBA 22G X 20CM

## (undated) DEVICE — SYR LUER LOK 30CC

## (undated) DEVICE — POSITIONER FOAM HEADREST (PINK)

## (undated) DEVICE — WARMING BLANKET UPPER ADULT

## (undated) DEVICE — SOL IRR BAG H2O 3000ML

## (undated) DEVICE — PACK CYSTO

## (undated) DEVICE — SUT SOFSILK 2-0 18" V-20 (POP-OFF)

## (undated) DEVICE — TUBING INSUFFLATION LAP FILTER 10FT

## (undated) DEVICE — SYR LUER LOK 10CC

## (undated) DEVICE — SYR ASEPTO

## (undated) DEVICE — GLV 6.5 PROTEXIS (WHITE)